# Patient Record
Sex: MALE | Race: OTHER | HISPANIC OR LATINO | URBAN - METROPOLITAN AREA
[De-identification: names, ages, dates, MRNs, and addresses within clinical notes are randomized per-mention and may not be internally consistent; named-entity substitution may affect disease eponyms.]

---

## 2017-08-05 ENCOUNTER — EMERGENCY (EMERGENCY)
Facility: HOSPITAL | Age: 42
LOS: 1 days | Discharge: HOME | End: 2017-08-05
Attending: EMERGENCY MEDICINE | Admitting: EMERGENCY MEDICINE
Payer: COMMERCIAL

## 2017-08-05 VITALS
TEMPERATURE: 99 F | SYSTOLIC BLOOD PRESSURE: 151 MMHG | RESPIRATION RATE: 16 BRPM | OXYGEN SATURATION: 97 % | DIASTOLIC BLOOD PRESSURE: 94 MMHG | WEIGHT: 199.96 LBS | HEIGHT: 65.75 IN | HEART RATE: 78 BPM

## 2017-08-05 PROBLEM — Z00.00 ENCOUNTER FOR PREVENTIVE HEALTH EXAMINATION: Status: ACTIVE | Noted: 2017-08-05

## 2017-08-05 PROCEDURE — T1013: CPT

## 2017-08-05 PROCEDURE — 99282 EMERGENCY DEPT VISIT SF MDM: CPT

## 2017-08-05 NOTE — ED STATDOCS - OBJECTIVE STATEMENT
42 y/o M w/ no significant PMHx presents to the ED here for a surgery consult. Pt has a mass on his R wrist x1 year. He has pain with the mass. Pt denies fever, CP, abd pain, SOB, HA or any other complaints at this time. Pt has no allergies and is on no medications.  : Angelita 40 y/o M w/ no significant PMHx presents to the ED here for a surgery consult. Pt has a mass on his R wrist x1 year. He has mild pain with the mass x 1 year. Pt denies fever, CP, abd pain, SOB, HA or any other complaints at this time. Pt has no allergies and is on no medications. denies numbness/tingling to fingers  : Angelita

## 2017-08-05 NOTE — ED ADULT TRIAGE NOTE - CHIEF COMPLAINT QUOTE
pt presents to ED with ED raised bump to right wrist x one year. pt states pain worsened x two weeks. afebrile.

## 2017-08-05 NOTE — ED STATDOCS - SKIN, MLM
3x3 cm cystic structure to radial aspect of the R hand w/ no erythema, no tenderness, and no increased warmth. 3x3 cm cystic structure to radial aspect of the R hand w/ no erythema, no tenderness, and no increased warmth. neurovascularly intact

## 2021-05-11 ENCOUNTER — INPATIENT (INPATIENT)
Facility: HOSPITAL | Age: 46
LOS: 2 days | Discharge: HOME | DRG: 176 | End: 2021-05-14
Attending: HOSPITALIST | Admitting: FAMILY MEDICINE
Payer: MEDICAID

## 2021-05-11 VITALS
OXYGEN SATURATION: 92 % | SYSTOLIC BLOOD PRESSURE: 155 MMHG | WEIGHT: 210.1 LBS | HEART RATE: 101 BPM | RESPIRATION RATE: 20 BRPM | DIASTOLIC BLOOD PRESSURE: 128 MMHG | HEIGHT: 65.75 IN | TEMPERATURE: 99 F

## 2021-05-11 DIAGNOSIS — I26.99 OTHER PULMONARY EMBOLISM WITHOUT ACUTE COR PULMONALE: ICD-10-CM

## 2021-05-11 DIAGNOSIS — R03.0 ELEVATED BLOOD-PRESSURE READING, WITHOUT DIAGNOSIS OF HYPERTENSION: ICD-10-CM

## 2021-05-11 DIAGNOSIS — E87.6 HYPOKALEMIA: ICD-10-CM

## 2021-05-11 DIAGNOSIS — R74.01 ELEVATION OF LEVELS OF LIVER TRANSAMINASE LEVELS: ICD-10-CM

## 2021-05-11 DIAGNOSIS — E66.9 OBESITY, UNSPECIFIED: ICD-10-CM

## 2021-05-11 LAB
ALBUMIN SERPL ELPH-MCNC: 4.2 G/DL — SIGNIFICANT CHANGE UP (ref 3.3–5.2)
ALP SERPL-CCNC: 78 U/L — SIGNIFICANT CHANGE UP (ref 40–120)
ALT FLD-CCNC: 114 U/L — HIGH
ANION GAP SERPL CALC-SCNC: 13 MMOL/L — SIGNIFICANT CHANGE UP (ref 5–17)
ANISOCYTOSIS BLD QL: SLIGHT — SIGNIFICANT CHANGE UP
APTT BLD: 149 SEC — CRITICAL HIGH (ref 27.5–35.5)
APTT BLD: 26.9 SEC — LOW (ref 27.5–35.5)
AST SERPL-CCNC: 99 U/L — HIGH
BASOPHILS # BLD AUTO: 0.2 K/UL — SIGNIFICANT CHANGE UP (ref 0–0.2)
BASOPHILS NFR BLD AUTO: 1.7 % — SIGNIFICANT CHANGE UP (ref 0–2)
BILIRUB SERPL-MCNC: 0.4 MG/DL — SIGNIFICANT CHANGE UP (ref 0.4–2)
BUN SERPL-MCNC: 14 MG/DL — SIGNIFICANT CHANGE UP (ref 8–20)
CALCIUM SERPL-MCNC: 9.9 MG/DL — SIGNIFICANT CHANGE UP (ref 8.6–10.2)
CHLORIDE SERPL-SCNC: 106 MMOL/L — SIGNIFICANT CHANGE UP (ref 98–107)
CO2 SERPL-SCNC: 24 MMOL/L — SIGNIFICANT CHANGE UP (ref 22–29)
CREAT SERPL-MCNC: 0.95 MG/DL — SIGNIFICANT CHANGE UP (ref 0.5–1.3)
CRP SERPL-MCNC: 46 MG/L — HIGH
D DIMER BLD IA.RAPID-MCNC: 1886 NG/ML DDU — HIGH
EOSINOPHIL # BLD AUTO: 0 K/UL — SIGNIFICANT CHANGE UP (ref 0–0.5)
EOSINOPHIL NFR BLD AUTO: 0 % — SIGNIFICANT CHANGE UP (ref 0–6)
FERRITIN SERPL-MCNC: 24 NG/ML — LOW (ref 30–400)
GIANT PLATELETS BLD QL SMEAR: PRESENT — SIGNIFICANT CHANGE UP
GLUCOSE SERPL-MCNC: 88 MG/DL — SIGNIFICANT CHANGE UP (ref 70–99)
HCT VFR BLD CALC: 39.3 % — SIGNIFICANT CHANGE UP (ref 39–50)
HCT VFR BLD CALC: 40.3 % — SIGNIFICANT CHANGE UP (ref 39–50)
HGB BLD-MCNC: 12.1 G/DL — LOW (ref 13–17)
HGB BLD-MCNC: 12.5 G/DL — LOW (ref 13–17)
INR BLD: 1.17 RATIO — HIGH (ref 0.88–1.16)
LYMPHOCYTES # BLD AUTO: 17.2 % — SIGNIFICANT CHANGE UP (ref 13–44)
LYMPHOCYTES # BLD AUTO: 2.01 K/UL — SIGNIFICANT CHANGE UP (ref 1–3.3)
MANUAL SMEAR VERIFICATION: SIGNIFICANT CHANGE UP
MCHC RBC-ENTMCNC: 21.4 PG — LOW (ref 27–34)
MCHC RBC-ENTMCNC: 21.4 PG — LOW (ref 27–34)
MCHC RBC-ENTMCNC: 30.8 GM/DL — LOW (ref 32–36)
MCHC RBC-ENTMCNC: 31 GM/DL — LOW (ref 32–36)
MCV RBC AUTO: 68.9 FL — LOW (ref 80–100)
MCV RBC AUTO: 69.4 FL — LOW (ref 80–100)
MICROCYTES BLD QL: SLIGHT — SIGNIFICANT CHANGE UP
MONOCYTES # BLD AUTO: 0.41 K/UL — SIGNIFICANT CHANGE UP (ref 0–0.9)
MONOCYTES NFR BLD AUTO: 3.5 % — SIGNIFICANT CHANGE UP (ref 2–14)
MYELOCYTES NFR BLD: 0.9 % — HIGH (ref 0–0)
NEUTROPHILS # BLD AUTO: 8.64 K/UL — HIGH (ref 1.8–7.4)
NEUTROPHILS NFR BLD AUTO: 74.1 % — SIGNIFICANT CHANGE UP (ref 43–77)
NRBC # BLD: 1 /100 — HIGH (ref 0–0)
PLAT MORPH BLD: NORMAL — SIGNIFICANT CHANGE UP
PLATELET # BLD AUTO: 195 K/UL — SIGNIFICANT CHANGE UP (ref 150–400)
PLATELET # BLD AUTO: 196 K/UL — SIGNIFICANT CHANGE UP (ref 150–400)
POLYCHROMASIA BLD QL SMEAR: SIGNIFICANT CHANGE UP
POTASSIUM SERPL-MCNC: 3.2 MMOL/L — LOW (ref 3.5–5.3)
POTASSIUM SERPL-SCNC: 3.2 MMOL/L — LOW (ref 3.5–5.3)
PROCALCITONIN SERPL-MCNC: 0.09 NG/ML — SIGNIFICANT CHANGE UP (ref 0.02–0.1)
PROT SERPL-MCNC: 7.5 G/DL — SIGNIFICANT CHANGE UP (ref 6.6–8.7)
PROTHROM AB SERPL-ACNC: 13.5 SEC — SIGNIFICANT CHANGE UP (ref 10.6–13.6)
RBC # BLD: 5.66 M/UL — SIGNIFICANT CHANGE UP (ref 4.2–5.8)
RBC # BLD: 5.85 M/UL — HIGH (ref 4.2–5.8)
RBC # FLD: 17.9 % — HIGH (ref 10.3–14.5)
RBC # FLD: 18 % — HIGH (ref 10.3–14.5)
RBC BLD AUTO: ABNORMAL
SARS-COV-2 RNA SPEC QL NAA+PROBE: SIGNIFICANT CHANGE UP
SODIUM SERPL-SCNC: 143 MMOL/L — SIGNIFICANT CHANGE UP (ref 135–145)
TROPONIN T SERPL-MCNC: 0.1 NG/ML — HIGH (ref 0–0.06)
VARIANT LYMPHS # BLD: 2.6 % — SIGNIFICANT CHANGE UP (ref 0–6)
WBC # BLD: 10.82 K/UL — HIGH (ref 3.8–10.5)
WBC # BLD: 11.66 K/UL — HIGH (ref 3.8–10.5)
WBC # FLD AUTO: 10.82 K/UL — HIGH (ref 3.8–10.5)
WBC # FLD AUTO: 11.66 K/UL — HIGH (ref 3.8–10.5)

## 2021-05-11 PROCEDURE — 93306 TTE W/DOPPLER COMPLETE: CPT | Mod: 26

## 2021-05-11 PROCEDURE — 93971 EXTREMITY STUDY: CPT | Mod: 26,RT

## 2021-05-11 PROCEDURE — G1004: CPT

## 2021-05-11 PROCEDURE — 71275 CT ANGIOGRAPHY CHEST: CPT | Mod: 26,ME

## 2021-05-11 PROCEDURE — 99223 1ST HOSP IP/OBS HIGH 75: CPT

## 2021-05-11 PROCEDURE — 99291 CRITICAL CARE FIRST HOUR: CPT

## 2021-05-11 PROCEDURE — 93010 ELECTROCARDIOGRAM REPORT: CPT

## 2021-05-11 PROCEDURE — 71045 X-RAY EXAM CHEST 1 VIEW: CPT | Mod: 26

## 2021-05-11 RX ORDER — HEPARIN SODIUM 5000 [USP'U]/ML
9000 INJECTION INTRAVENOUS; SUBCUTANEOUS ONCE
Refills: 0 | Status: COMPLETED | OUTPATIENT
Start: 2021-05-11 | End: 2021-05-11

## 2021-05-11 RX ORDER — HEPARIN SODIUM 5000 [USP'U]/ML
INJECTION INTRAVENOUS; SUBCUTANEOUS
Qty: 25000 | Refills: 0 | Status: DISCONTINUED | OUTPATIENT
Start: 2021-05-11 | End: 2021-05-12

## 2021-05-11 RX ORDER — AMLODIPINE BESYLATE 2.5 MG/1
5 TABLET ORAL DAILY
Refills: 0 | Status: DISCONTINUED | OUTPATIENT
Start: 2021-05-11 | End: 2021-05-14

## 2021-05-11 RX ORDER — HEPARIN SODIUM 5000 [USP'U]/ML
4500 INJECTION INTRAVENOUS; SUBCUTANEOUS EVERY 6 HOURS
Refills: 0 | Status: DISCONTINUED | OUTPATIENT
Start: 2021-05-11 | End: 2021-05-12

## 2021-05-11 RX ORDER — POTASSIUM CHLORIDE 20 MEQ
40 PACKET (EA) ORAL ONCE
Refills: 0 | Status: COMPLETED | OUTPATIENT
Start: 2021-05-11 | End: 2021-05-11

## 2021-05-11 RX ORDER — ALBUTEROL 90 UG/1
2.5 AEROSOL, METERED ORAL EVERY 6 HOURS
Refills: 0 | Status: DISCONTINUED | OUTPATIENT
Start: 2021-05-11 | End: 2021-05-14

## 2021-05-11 RX ORDER — ALBUTEROL 90 UG/1
2 AEROSOL, METERED ORAL ONCE
Refills: 0 | Status: COMPLETED | OUTPATIENT
Start: 2021-05-11 | End: 2021-05-11

## 2021-05-11 RX ORDER — HEPARIN SODIUM 5000 [USP'U]/ML
9000 INJECTION INTRAVENOUS; SUBCUTANEOUS EVERY 6 HOURS
Refills: 0 | Status: DISCONTINUED | OUTPATIENT
Start: 2021-05-11 | End: 2021-05-12

## 2021-05-11 RX ADMIN — HEPARIN SODIUM 9000 UNIT(S): 5000 INJECTION INTRAVENOUS; SUBCUTANEOUS at 17:50

## 2021-05-11 RX ADMIN — ALBUTEROL 2 PUFF(S): 90 AEROSOL, METERED ORAL at 12:28

## 2021-05-11 RX ADMIN — Medication 40 MILLIEQUIVALENT(S): at 14:20

## 2021-05-11 RX ADMIN — AMLODIPINE BESYLATE 5 MILLIGRAM(S): 2.5 TABLET ORAL at 21:57

## 2021-05-11 RX ADMIN — HEPARIN SODIUM 2000 UNIT(S)/HR: 5000 INJECTION INTRAVENOUS; SUBCUTANEOUS at 17:50

## 2021-05-11 NOTE — H&P ADULT - PROBLEM SELECTOR PLAN 4
pt. has reported no h/o htn but bp noted to be running high in the ER, will keep on norvasc 5 mg daily with parameters and monitor bp closely.

## 2021-05-11 NOTE — ED PROVIDER NOTE - OBJECTIVE STATEMENT
44 y/o M with no known PMHx p/w 3 days of DIXON and SOB worse with walking.  Pt denies f/c/r but c/o dry cough and some cramping abdominal pain while walking.  PT denies n/v/d.  PT denies recent trauma.  He denies recent travel or known sick contacts.  PT hasn't had Covid in the past and hasn't had a Covid vaccination.  He currently works at a [employer].

## 2021-05-11 NOTE — H&P ADULT - PROBLEM SELECTOR PLAN 3
likely related to his obesity ast 99, alt 114, but will send hepatitis panel to be followed, will trend.

## 2021-05-11 NOTE — H&P ADULT - PROBLEM SELECTOR PLAN 1
pt's ct angio with Bilateral multifocal lobar and segmental pulmonary emboli with associated right ventricular strain.  clinically pt. stable and not in acute respiratory distress, ICu consult appreciated, pt. will be on heparin drip. o2 support  and albuterol neb as needed. echo to follow, trop to trend 1st 0.10, bnp 1704. case discussed with cardiologist Dr. Powell.   Pt's PE appears unprovoked, pt. will benefit from hematology follow up as an out-pt for genetic testing for hypercoagulability.

## 2021-05-11 NOTE — CONSULT NOTE ADULT - SUBJECTIVE AND OBJECTIVE BOX
Patient is a 45y old  Male who presents with a chief complaint of     BRIEF HOSPITAL COURSE:   45M w/ no significant PMHx admitted w/ exertional dyspnea for the past few days. Denies any CP, palpitations, LOC, fever, cough, N/V/D. ICU consulted for PE seen on CTA. No h/o recent travel, LE pain/swelling and no significant FHx. No COVID contacts. On ICU eval he was hemodynamically stable w/ no increased WOB and was saturating in low 90s on RA    PAST MEDICAL & SURGICAL HISTORY:  No pertinent past medical history    No significant past surgical history    Review of Systems:  CONSTITUTIONAL: No fever, chills, or fatigue  EYES: No eye pain, visual disturbances, or discharge  ENMT:  No difficulty hearing, tinnitus, vertigo; No sinus or throat pain  NECK: No pain or stiffness  RESPIRATORY: No cough, wheezing, chills or hemoptysis; No shortness of breath  CARDIOVASCULAR: No chest pain, palpitations, dizziness, or leg swelling  GASTROINTESTINAL: No abdominal or epigastric pain. No nausea, vomiting, or hematemesis; No diarrhea or constipation. No melena or hematochezia.  GENITOURINARY: No dysuria, frequency, hematuria, or incontinence  NEUROLOGICAL: No headaches, memory loss, loss of strength, numbness, or tremors  SKIN: No itching, burning, rashes, or lesions   MUSCULOSKELETAL: No joint pain or swelling; No muscle, back, or extremity pain  PSYCHIATRIC: No depression, anxiety, mood swings, or difficulty sleeping    Physical Examination:    ICU Vital Signs Last 24 Hrs  T(C): 37.1 (11 May 2021 11:04), Max: 37.1 (11 May 2021 11:04)  T(F): 98.7 (11 May 2021 11:04), Max: 98.7 (11 May 2021 11:04)  HR: 101 (11 May 2021 11:04) (101 - 101)  BP: 155/128 (11 May 2021 11:04) (155/128 - 155/128)  BP(mean): --  ABP: --  ABP(mean): --  RR: 20 (11 May 2021 11:04) (20 - 20)  SpO2: 92% (11 May 2021 11:04) (92% - 92%)    General: No acute distress  Neuro: AAO*3, No motor, sensory, or cranial nerve deficit  HEENT: Pupils equal, reactive to light, Moist oral mucosa  PULM: Clear to auscultation bilaterally  CVS: Regular rhythm and controlled rate  ABD: Soft, nondistended, nontender, normoactive bowel sounds  EXT: No b/l LE edema, nontender  SKIN: Warm and well perfused, no acute rashes       Medications:  heparin   Injectable 9000 Unit(s) IV Push every 6 hours PRN  heparin   Injectable 4500 Unit(s) IV Push every 6 hours PRN  heparin  Infusion.  Unit(s)/Hr IV Continuous <Continuous>    I&O's Detail        RADIOLOGY/ Microbiology/ Labs: reviewed

## 2021-05-11 NOTE — ED PROVIDER NOTE - CARE PLAN
Principal Discharge DX:	Acute pulmonary embolism, unspecified pulmonary embolism type, unspecified whether acute cor pulmonale present

## 2021-05-11 NOTE — ED ADULT TRIAGE NOTE - CHIEF COMPLAINT QUOTE
patient c/o SOB since yesterday when exerting oneself. stomach accompanies the symptoms. denies medical problems but does not see any doctors.

## 2021-05-11 NOTE — ED PROVIDER NOTE - PROGRESS NOTE DETAILS
AJM: pt received in sign out. VS improved. CTA shows extensive PE with right heart strain + cardiac biomarkers. already on heparin. ICU consulted for possible micu and tpa vs floor. pending ICU eval. Stable at this time. AJM: pt seen by cards and stable for cdu. decline tpa reccs. spoke to dr blank for admission who requests echo and cards consult. orders placed.

## 2021-05-11 NOTE — ED PROVIDER NOTE - CLINICAL SUMMARY MEDICAL DECISION MAKING FREE TEXT BOX
Pt p/w DIXON, SOB, hypoxia and Right leg pain/swelling.  DDx includes DVT/PE, pneumonia, Covid 19. Plan: Cardiac workup, RLE doppler, Covid swab, CTA chest

## 2021-05-11 NOTE — H&P ADULT - NSHPPHYSICALEXAM_GEN_ALL_CORE
General: Obese  male lying in bed not in distress.  HEENT: AT, NC. PERRL. intact EOM. no throat erythema or exudate.   Neck: supple. no JVD.   Chest: air entry is fair bilaterally. no inter costal retractions.   Heart: S1,S2. RRR. no heart murmur. no edema of upper / lower ext.   Abdomen: soft. non-tender. obese, + BS.   Ext: no calf tenderness on either side, ROM of all ext. intact, distal pulses intact.   Neuro: AAO x3. no focal weakness. no speech disorder, cns ii to xii intact. m /r/s intact.   Skin: no obvious rash noted, no pallor, no diaphoresis.   Psych : normal affect , no si/hi. General: Obese  male lying in bed not in distress.  HEENT: AT, NC. PERRL. intact EOM. no throat erythema or exudate.   Neck: supple. no JVD.   Chest: air entry is fair bilaterally. no inter costal retractions. talking in full sentences.   Heart: S1,S2. RRR. no heart murmur. no edema of upper / lower ext.   Abdomen: soft. non-tender. obese, + BS.   Ext: no calf tenderness on either side, ROM of all ext. intact, distal pulses intact.   Neuro: AAO x3. no focal weakness. no speech disorder, cns ii to xii intact. m /r/s intact.   Skin: no obvious rash noted, no pallor, no diaphoresis.   Psych : normal affect , no si/hi.

## 2021-05-11 NOTE — CONSULT NOTE ADULT - ASSESSMENT
45M w/ no significant PMHx admitted w/ exertional dyspnea     Intermediate high risk PE     Segmental PEs on CTA w/ RV strain and elevated biomarkers  Continue hep gtt. Check TTE and trend enzymes  Will activate PERT team and also needs a routine Heam consult  No ICU needs at this time   Case d/w ED team  45M w/ no significant PMHx admitted w/ exertional dyspnea     Intermediate high risk PE     Segmental PEs on CTA w/ RV strain and elevated biomarkers  SPESI score: 0, PHUC: stage 2 w/ 18% PE related complications  Continue hep gtt. Check TTE and trend enzymes  Will activate PERT team and also needs a routine Heam consult  No ICU needs at this time   Case d/w ED team

## 2021-05-11 NOTE — H&P ADULT - ASSESSMENT
pt. is a 44 y/o obese male with no significant PMHx , on no medicines came to the ER for exertional dyspnea for the past few days. pt. works in a bakery. Denies any CP, palpitations, LOC, fever, cough, N/V/D. no sick contact,  no recent travel. covid-19 negative in the ER. no abd. pain, no black stool or hematemesis/ hemoptysis reported. pt. has reported some vague pain in RLE x 1 day but no pain at the time of admission. 02 sat 92-94 % RA. RLE doppler negative for dvt. pt's ct angio chest :  Bilateral multifocal lobar and segmental pulmonary emboli with associated right ventricular strain.

## 2021-05-11 NOTE — ED ADULT NURSE NOTE - NSIMPLEMENTINTERV_GEN_ALL_ED
Implemented All Universal Safety Interventions:  [place] to call system. Call bell, personal items and telephone within reach. Instruct patient to call for assistance. Room bathroom lighting operational. Non-slip footwear when patient is off stretcher. Physically safe environment: no spills, clutter or unnecessary equipment. Stretcher in lowest position, wheels locked, appropriate side rails in place.

## 2021-05-11 NOTE — H&P ADULT - HISTORY OF PRESENT ILLNESS
pt. is a 46 y/o obese male with no significant PMHx came to the ER for exertional dyspnea for the past few days. pt. works in a bakery. Denies any CP, palpitations, LOC, fever, cough, N/V/D. no sick contact,  no recent travel. covid-19 negative in the ER. no abd. pain, no black stool or hematemesis/ hemoptysis reported. pt. has reported some vague pain in RLE x 1 day but no pain at the time of admission. 02 sat 92-94 % RA. RLE doppler negative for dvt. pt's ct angio chest :  Bilateral multifocal lobar and segmental pulmonary emboli with associated right ventricular strain. pt. is a 44 y/o obese male with no significant PMHx , on no medicines came to the ER for exertional dyspnea for the past few days. pt. works in a bakery. Denies any CP, palpitations, LOC, fever, cough, N/V/D. no sick contact,  no recent travel. covid-19 negative in the ER. no abd. pain, no black stool or hematemesis/ hemoptysis reported. pt. has reported some vague pain in RLE x 1 day but no pain at the time of admission. 02 sat 92-94 % RA. RLE doppler negative for dvt. pt's ct angio chest :  Bilateral multifocal lobar and segmental pulmonary emboli with associated right ventricular strain.  pt. has been started on heparin drip in the ER. Echo is requested.

## 2021-05-12 DIAGNOSIS — I26.09 OTHER PULMONARY EMBOLISM WITH ACUTE COR PULMONALE: ICD-10-CM

## 2021-05-12 DIAGNOSIS — I26.99 OTHER PULMONARY EMBOLISM WITHOUT ACUTE COR PULMONALE: ICD-10-CM

## 2021-05-12 LAB
ALBUMIN SERPL ELPH-MCNC: 4.1 G/DL — SIGNIFICANT CHANGE UP (ref 3.3–5.2)
ALP SERPL-CCNC: 76 U/L — SIGNIFICANT CHANGE UP (ref 40–120)
ALT FLD-CCNC: 96 U/L — HIGH
ANION GAP SERPL CALC-SCNC: 13 MMOL/L — SIGNIFICANT CHANGE UP (ref 5–17)
APTT BLD: 56.5 SEC — HIGH (ref 27.5–35.5)
APTT BLD: 70.7 SEC — HIGH (ref 27.5–35.5)
APTT BLD: 90.2 SEC — HIGH (ref 27.5–35.5)
AST SERPL-CCNC: 56 U/L — HIGH
BASOPHILS # BLD AUTO: 0.07 K/UL — SIGNIFICANT CHANGE UP (ref 0–0.2)
BASOPHILS NFR BLD AUTO: 0.6 % — SIGNIFICANT CHANGE UP (ref 0–2)
BILIRUB SERPL-MCNC: 0.6 MG/DL — SIGNIFICANT CHANGE UP (ref 0.4–2)
BUN SERPL-MCNC: 11 MG/DL — SIGNIFICANT CHANGE UP (ref 8–20)
CALCIUM SERPL-MCNC: 9.7 MG/DL — SIGNIFICANT CHANGE UP (ref 8.6–10.2)
CHLORIDE SERPL-SCNC: 105 MMOL/L — SIGNIFICANT CHANGE UP (ref 98–107)
CO2 SERPL-SCNC: 25 MMOL/L — SIGNIFICANT CHANGE UP (ref 22–29)
COVID-19 SPIKE DOMAIN AB INTERP: POSITIVE
COVID-19 SPIKE DOMAIN ANTIBODY RESULT: >250 U/ML — HIGH
CREAT SERPL-MCNC: 0.9 MG/DL — SIGNIFICANT CHANGE UP (ref 0.5–1.3)
EOSINOPHIL # BLD AUTO: 0.25 K/UL — SIGNIFICANT CHANGE UP (ref 0–0.5)
EOSINOPHIL NFR BLD AUTO: 2 % — SIGNIFICANT CHANGE UP (ref 0–6)
FIBRINOGEN PPP-MCNC: 570 MG/DL — HIGH (ref 290–520)
GLUCOSE SERPL-MCNC: 102 MG/DL — HIGH (ref 70–99)
HAV IGM SER-ACNC: SIGNIFICANT CHANGE UP
HBV CORE IGM SER-ACNC: SIGNIFICANT CHANGE UP
HBV SURFACE AG SER-ACNC: SIGNIFICANT CHANGE UP
HCT VFR BLD CALC: 39.3 % — SIGNIFICANT CHANGE UP (ref 39–50)
HCT VFR BLD CALC: 40.6 % — SIGNIFICANT CHANGE UP (ref 39–50)
HCT VFR BLD CALC: 42.2 % — SIGNIFICANT CHANGE UP (ref 39–50)
HCV AB S/CO SERPL IA: 0.21 S/CO — SIGNIFICANT CHANGE UP (ref 0–0.99)
HCV AB SERPL-IMP: SIGNIFICANT CHANGE UP
HGB BLD-MCNC: 12.3 G/DL — LOW (ref 13–17)
HGB BLD-MCNC: 12.5 G/DL — LOW (ref 13–17)
HGB BLD-MCNC: 13 G/DL — SIGNIFICANT CHANGE UP (ref 13–17)
IMM GRANULOCYTES NFR BLD AUTO: 0.6 % — SIGNIFICANT CHANGE UP (ref 0–1.5)
LYMPHOCYTES # BLD AUTO: 2.95 K/UL — SIGNIFICANT CHANGE UP (ref 1–3.3)
LYMPHOCYTES # BLD AUTO: 24 % — SIGNIFICANT CHANGE UP (ref 13–44)
MCHC RBC-ENTMCNC: 21.4 PG — LOW (ref 27–34)
MCHC RBC-ENTMCNC: 21.5 PG — LOW (ref 27–34)
MCHC RBC-ENTMCNC: 21.6 PG — LOW (ref 27–34)
MCHC RBC-ENTMCNC: 30.8 GM/DL — LOW (ref 32–36)
MCHC RBC-ENTMCNC: 30.8 GM/DL — LOW (ref 32–36)
MCHC RBC-ENTMCNC: 31.3 GM/DL — LOW (ref 32–36)
MCV RBC AUTO: 68.6 FL — LOW (ref 80–100)
MCV RBC AUTO: 69.5 FL — LOW (ref 80–100)
MCV RBC AUTO: 70 FL — LOW (ref 80–100)
MONOCYTES # BLD AUTO: 1.05 K/UL — HIGH (ref 0–0.9)
MONOCYTES NFR BLD AUTO: 8.5 % — SIGNIFICANT CHANGE UP (ref 2–14)
NEUTROPHILS # BLD AUTO: 7.91 K/UL — HIGH (ref 1.8–7.4)
NEUTROPHILS NFR BLD AUTO: 64.3 % — SIGNIFICANT CHANGE UP (ref 43–77)
PLATELET # BLD AUTO: 202 K/UL — SIGNIFICANT CHANGE UP (ref 150–400)
PLATELET # BLD AUTO: 206 K/UL — SIGNIFICANT CHANGE UP (ref 150–400)
PLATELET # BLD AUTO: 216 K/UL — SIGNIFICANT CHANGE UP (ref 150–400)
POTASSIUM SERPL-MCNC: 3.4 MMOL/L — LOW (ref 3.5–5.3)
POTASSIUM SERPL-SCNC: 3.4 MMOL/L — LOW (ref 3.5–5.3)
PROT SERPL-MCNC: 8 G/DL — SIGNIFICANT CHANGE UP (ref 6.6–8.7)
RBC # BLD: 5.73 M/UL — SIGNIFICANT CHANGE UP (ref 4.2–5.8)
RBC # BLD: 5.84 M/UL — HIGH (ref 4.2–5.8)
RBC # BLD: 6.03 M/UL — HIGH (ref 4.2–5.8)
RBC # FLD: 18.5 % — HIGH (ref 10.3–14.5)
RBC # FLD: 18.5 % — HIGH (ref 10.3–14.5)
RBC # FLD: 18.7 % — HIGH (ref 10.3–14.5)
SARS-COV-2 IGG+IGM SERPL QL IA: >250 U/ML — HIGH
SARS-COV-2 IGG+IGM SERPL QL IA: POSITIVE
SODIUM SERPL-SCNC: 143 MMOL/L — SIGNIFICANT CHANGE UP (ref 135–145)
TROPONIN T SERPL-MCNC: 0.05 NG/ML — SIGNIFICANT CHANGE UP (ref 0–0.06)
TROPONIN T SERPL-MCNC: 0.06 NG/ML — SIGNIFICANT CHANGE UP (ref 0–0.06)
WBC # BLD: 12.31 K/UL — HIGH (ref 3.8–10.5)
WBC # BLD: 9.73 K/UL — SIGNIFICANT CHANGE UP (ref 3.8–10.5)
WBC # BLD: 9.93 K/UL — SIGNIFICANT CHANGE UP (ref 3.8–10.5)
WBC # FLD AUTO: 12.31 K/UL — HIGH (ref 3.8–10.5)
WBC # FLD AUTO: 9.73 K/UL — SIGNIFICANT CHANGE UP (ref 3.8–10.5)
WBC # FLD AUTO: 9.93 K/UL — SIGNIFICANT CHANGE UP (ref 3.8–10.5)

## 2021-05-12 PROCEDURE — 99233 SBSQ HOSP IP/OBS HIGH 50: CPT

## 2021-05-12 PROCEDURE — 99152 MOD SED SAME PHYS/QHP 5/>YRS: CPT | Mod: 59

## 2021-05-12 PROCEDURE — 75743 ARTERY X-RAYS LUNGS: CPT | Mod: 26,59

## 2021-05-12 PROCEDURE — 37211 THROMBOLYTIC ART THERAPY: CPT | Mod: 50,59

## 2021-05-12 PROCEDURE — 36014 PLACE CATHETER IN ARTERY: CPT | Mod: 59

## 2021-05-12 PROCEDURE — 99223 1ST HOSP IP/OBS HIGH 75: CPT

## 2021-05-12 RX ORDER — HEPARIN SODIUM 5000 [USP'U]/ML
9000 INJECTION INTRAVENOUS; SUBCUTANEOUS EVERY 6 HOURS
Refills: 0 | Status: DISCONTINUED | OUTPATIENT
Start: 2021-05-12 | End: 2021-05-12

## 2021-05-12 RX ORDER — HEPARIN SODIUM 5000 [USP'U]/ML
1600 INJECTION INTRAVENOUS; SUBCUTANEOUS
Qty: 25000 | Refills: 0 | Status: DISCONTINUED | OUTPATIENT
Start: 2021-05-12 | End: 2021-05-12

## 2021-05-12 RX ORDER — HEPARIN SODIUM 5000 [USP'U]/ML
400 INJECTION INTRAVENOUS; SUBCUTANEOUS
Qty: 25000 | Refills: 0 | Status: DISCONTINUED | OUTPATIENT
Start: 2021-05-12 | End: 2021-05-13

## 2021-05-12 RX ORDER — ALTEPLASE 100 MG
1 KIT INTRAVENOUS
Qty: 10 | Refills: 0 | Status: DISCONTINUED | OUTPATIENT
Start: 2021-05-12 | End: 2021-05-13

## 2021-05-12 RX ORDER — LOSARTAN POTASSIUM 100 MG/1
50 TABLET, FILM COATED ORAL DAILY
Refills: 0 | Status: DISCONTINUED | OUTPATIENT
Start: 2021-05-12 | End: 2021-05-12

## 2021-05-12 RX ORDER — HEPARIN SODIUM 5000 [USP'U]/ML
500 INJECTION INTRAVENOUS; SUBCUTANEOUS
Qty: 25000 | Refills: 0 | Status: DISCONTINUED | OUTPATIENT
Start: 2021-05-12 | End: 2021-05-12

## 2021-05-12 RX ORDER — POTASSIUM CHLORIDE 20 MEQ
10 PACKET (EA) ORAL
Refills: 0 | Status: COMPLETED | OUTPATIENT
Start: 2021-05-12 | End: 2021-05-12

## 2021-05-12 RX ORDER — HEPARIN SODIUM 5000 [USP'U]/ML
4500 INJECTION INTRAVENOUS; SUBCUTANEOUS EVERY 6 HOURS
Refills: 0 | Status: DISCONTINUED | OUTPATIENT
Start: 2021-05-12 | End: 2021-05-12

## 2021-05-12 RX ORDER — HEPARIN SODIUM 5000 [USP'U]/ML
500 INJECTION INTRAVENOUS; SUBCUTANEOUS
Qty: 25000 | Refills: 0 | Status: DISCONTINUED | OUTPATIENT
Start: 2021-05-12 | End: 2021-05-13

## 2021-05-12 RX ORDER — SODIUM CHLORIDE 9 MG/ML
1000 INJECTION INTRAMUSCULAR; INTRAVENOUS; SUBCUTANEOUS
Refills: 0 | Status: DISCONTINUED | OUTPATIENT
Start: 2021-05-12 | End: 2021-05-13

## 2021-05-12 RX ORDER — LOSARTAN POTASSIUM 100 MG/1
100 TABLET, FILM COATED ORAL DAILY
Refills: 0 | Status: DISCONTINUED | OUTPATIENT
Start: 2021-05-12 | End: 2021-05-14

## 2021-05-12 RX ADMIN — Medication 100 MILLIEQUIVALENT(S): at 13:00

## 2021-05-12 RX ADMIN — HEPARIN SODIUM 1600 UNIT(S)/HR: 5000 INJECTION INTRAVENOUS; SUBCUTANEOUS at 08:11

## 2021-05-12 RX ADMIN — HEPARIN SODIUM 0 UNIT(S)/HR: 5000 INJECTION INTRAVENOUS; SUBCUTANEOUS at 00:02

## 2021-05-12 RX ADMIN — Medication 100 MILLIEQUIVALENT(S): at 14:00

## 2021-05-12 RX ADMIN — HEPARIN SODIUM 1600 UNIT(S)/HR: 5000 INJECTION INTRAVENOUS; SUBCUTANEOUS at 01:19

## 2021-05-12 RX ADMIN — Medication 100 MILLIEQUIVALENT(S): at 17:52

## 2021-05-12 RX ADMIN — LOSARTAN POTASSIUM 100 MILLIGRAM(S): 100 TABLET, FILM COATED ORAL at 10:14

## 2021-05-12 RX ADMIN — HEPARIN SODIUM 4 UNIT(S)/HR: 5000 INJECTION INTRAVENOUS; SUBCUTANEOUS at 22:30

## 2021-05-12 RX ADMIN — AMLODIPINE BESYLATE 5 MILLIGRAM(S): 2.5 TABLET ORAL at 06:21

## 2021-05-12 NOTE — CONSULT NOTE ADULT - PROBLEM SELECTOR RECOMMENDATION 9
Unprovoked PE  Telemonitor  O2 nasal cannula 2 lt PRN  Maintain NPO x now  Continuye Heparine Unprovoked Bilateral pulmonary embolism.  Telemonitor  O2 nasal cannula 2 lt PRN  Maintain NPO x now  Continuye Heparine  Echo and stress test pending   Trend troponin and serial EKG  PESI score  55 points. Class I, Very Low Risk: 0-1.6% 30-day mortality in this group. Unprovoked Bilateral pulmonary embolism.  Telemonitor  O2 nasal cannula 2 lt PRN  Maintain NPO x now  Continuye Heparine  Echo pending   Trend troponin and serial EKG  PESI score  55 points. Class I, Very Low Risk: 0-1.6% 30-day mortality in this group.

## 2021-05-12 NOTE — PROGRESS NOTE ADULT - ASSESSMENT
1- Bilateral segmental and subsegmental pulmonary embolisms with cor pulmonale  2- s/p EKOS with bilateral femoral catheters infusing TPA, heparin and saline  3- HTN  4- hypokalemia  5- Transaminititis    ICU ASSESSMENT AND PLAN:  1- Pulmonary Embolisms  - Continue TPA, which will finish infusion at 4:30am tomorrow morning  - Continue Heparin infusion, while TPA infusing, goal PTT is 40-60, after TPA completed and catheter has been removed, increase PTT goal to 60-90  - Patient to remain flat, max head of bed to 30', patient understands risks  - Repeat Echo tomorrow evening  - Neurovascular checks q1h until patient can ambulate    2- EKOS, plan as above    3- HTN  - Started Norvasc, Losartan  - May consider hydralazine in shorterm overnight for BP control while patient is bedbound    4- Potassium repleted  - Repeat labs in AM  - Keep K >4.0, Mg >2.0, Phos >3.0    5- trend LFTs

## 2021-05-12 NOTE — PROGRESS NOTE ADULT - SUBJECTIVE AND OBJECTIVE BOX
Department of Cardiology                                         [facility]/[place] [address]                                 Telephone: [phone]. Fax:[phone]                                         Pre Procedure Cardiac Cath NP Note      Narrative:      44 y/o obese male with no significant PMHx , on no medicines came to the ER for exertional dyspnea for the past few days. pt. works in a bakery.           ASA:  2  Mallampati:  2   Bleeding Risk:     Creatinine:   0.90  GFR:  103   	  MEDICATIONS:  amLODIPine   Tablet 5 milliGRAM(s) Oral daily  losartan 100 milliGRAM(s) Oral daily  ALBUTerol    0.083% 2.5 milliGRAM(s) Nebulizer every 6 hours PRN  alteplase    Infusion 1 mG/Hr IntraCatheter.. <Continuous>  alteplase    Infusion 1 mG/Hr IntraCatheter.. <Continuous>  heparin   Injectable 9000 Unit(s) IV Push every 6 hours PRN  heparin   Injectable 4500 Unit(s) IV Push every 6 hours PRN  heparin  Infusion. 1600 Unit(s)/Hr IV Continuous <Continuous>  potassium chloride  10 mEq/100 mL IVPB 10 milliEquivalent(s) IV Intermittent every 1 hour                   12.5   9.93  )-----------( 206      ( 12 May 2021 07:31 )             40.6         143  |  105  |  11.0  ----------------------------<  102<H>  3.4<L>   |  25.0  |  0.90    Ca    9.7      12 May 2021 06:35    TPro  8.0  /  Alb  4.1  /  TBili  0.6  /  DBili  x   /  AST  56<H>  /  ALT  96<H>  /  AlkPhos  76        PHYSICAL EXAM:    T(C): 36.9 (21 @ 11:45), Max: 36.9 (21 @ 21:36)  HR: 94 (21 @ 14:00) (89 - 107)  BP: 132/92 (21 @ 14:00) (109/98 - 157/105)  RR: 16 (21 @ 14:00) (13 - 25)  SpO2: 95% (21 @ 14:00) (90% - 97%)      I&O's Summary    12 May 2021 07:01  -  12 May 2021 15:38  --------------------------------------------------------  IN: 248 mL / OUT: 300 mL / NET: -52 mL        Daily Weight in k.2 (12 May 2021 11:45)    Constitutional: A & O x 3  HEENT:   Normal oral mucosa, PERRL, EOMI	  Cardiovascular: Normal S1 S2, No JVD, No murmurs, No edema  Respiratory: Lungs clear to auscultation	  Gastrointestinal:  Soft, Non-tender, + BS	  Skin: No rashes, No ecchymoses, No cyanosis  Neurologic: Non-focal  Extremities: Normal range of motion, No clubbing, cyanosis or edema  Vascular: Peripheral pulses palpable 2+ bilaterally    TELEMETRY: 	    ECG:  	  < from: 12 Lead ECG (21 @ 11:11) >  Sinus tachycardia  Otherwise normal ECG    Confirmed by ROBERT MAX ([id number]) on 2021 11:28:57 PM    < end of copied text >    RADIOLOGY:   DIAGNOSTIC TESTING:  [ X] Echocardiogram:  < from: TTE Echo Complete w/o Contrast w/ Doppler (21 @ 20:01) >  Summary:   1. Technically adequate study.   2. Normal global left ventricular systolic function.   3. Left ventricular ejection fraction, by visual estimation, is 65 to 70%.   4. The left ventricular diastolic function could not be assessed in this study.   5. There is moderate concentric left ventricular hypertrophy.   6. Moderately enlarged right ventricle.   7. Severely reduced RV systolic function. Dow sign (RV apical hyperkinesis with RV free wall akinesis) is seen. Finding suggests acute PE.   8. Mildly enlarged right atrium.   9. Normal left atrial size.  10. Trace mitral valve regurgitation.  11. Mild tricuspid regurgitation.  12. Estimated pulmonary artery systolic pressure is 38.3 mmHg assuming a right atrial pressure of 3 mmHg, which is consistent with borderline pulmonary hypertension.  13. Trivial pericardial effusion.    MD Cuco Electronically signed on 2021 at 8:31:26 AM  *** Final ***      CONNIE FLANNERY MD; Attending Cardiologist  This document has been electronically signed. May 11 2021  8:01PM    < end of copied text >      ASSESSMENT AND PLAN:           -consent to be obtained  -procedure discussed with patient; risks and benefits explained, questions answered  -labs and ECG reviewed                                                        Department of Cardiology                                         [facility]/[place] [address] [address] [facility]                                 Telephone: [phone]. Fax:[phone]                                         Pre Procedure Cardiac Cath NP Note      Narrative:      46 y/o obese male with no significant PMHx , on no medicines came to the ER for exertional dyspnea for the past few days. pt. works in a bakery.  Patient was negative for DVT in right lower extremity CTA chest showed Bilateral multifocal lobar and segmental pulmonary emboli with associated right ventricular strain. Was started on heparin gtt. Echo showed severely reduced RV systolic function. Dow sign (RV apical hyperkinesis with RV free wall akinesis) is seen. Finding suggests acute PE. EF 65 - 70%.     Patient is now scheduled for EKOS procedure with Dr. Moulton.       ASA:  2  Mallampati:  2   Bleeding Risk:     Creatinine:   0.90  GFR:  103   	  MEDICATIONS:  amLODIPine   Tablet 5 milliGRAM(s) Oral daily  losartan 100 milliGRAM(s) Oral daily  ALBUTerol    0.083% 2.5 milliGRAM(s) Nebulizer every 6 hours PRN  alteplase    Infusion 1 mG/Hr IntraCatheter.. <Continuous>  alteplase    Infusion 1 mG/Hr IntraCatheter.. <Continuous>  heparin   Injectable 9000 Unit(s) IV Push every 6 hours PRN  heparin   Injectable 4500 Unit(s) IV Push every 6 hours PRN  heparin  Infusion. 1600 Unit(s)/Hr IV Continuous <Continuous>  potassium chloride  10 mEq/100 mL IVPB 10 milliEquivalent(s) IV Intermittent every 1 hour                   12.5   9.93  )-----------( 206      ( 12 May 2021 07:31 )             40.6         143  |  105  |  11.0  ----------------------------<  102<H>  3.4<L>   |  25.0  |  0.90    Ca    9.7      12 May 2021 06:35    TPro  8.0  /  Alb  4.1  /  TBili  0.6  /  DBili  x   /  AST  56<H>  /  ALT  96<H>  /  AlkPhos  76        PHYSICAL EXAM:    T(C): 36.9 (21 @ 11:45), Max: 36.9 (21 @ 21:36)  HR: 94 (21 @ 14:00) (89 - 107)  BP: 132/92 (21 @ 14:00) (109/98 - 157/105)  RR: 16 (21 @ 14:00) (13 - 25)  SpO2: 95% (21 @ 14:00) (90% - 97%)      I&O's Summary    12 May 2021 07:01  -  12 May 2021 15:38  --------------------------------------------------------  IN: 248 mL / OUT: 300 mL / NET: -52 mL        Daily Weight in k.2 (12 May 2021 11:45)    Constitutional: A & O x 3  HEENT:   Normal oral mucosa, PERRL, EOMI	  Cardiovascular: Normal S1 S2, No JVD, No murmurs, No edema  Respiratory: Lungs clear to auscultation	  Gastrointestinal:  Soft, Non-tender, + BS	  Skin: No rashes, No ecchymoses, No cyanosis  Neurologic: Non-focal  Extremities: Normal range of motion, No clubbing, cyanosis or edema  Vascular: Peripheral pulses palpable 2+ bilaterally    TELEMETRY: 	    ECG:  	  < from: 12 Lead ECG (21 @ 11:11) >  Sinus tachycardia  Otherwise normal ECG    Confirmed by ROBERT MAX ([id number]) on 2021 11:28:57 PM    < end of copied text >    RADIOLOGY:   DIAGNOSTIC TESTING:  [ X] Echocardiogram:  < from: TTE Echo Complete w/o Contrast w/ Doppler (21 @ 20:01) >  Summary:   1. Technically adequate study.   2. Normal global left ventricular systolic function.   3. Left ventricular ejection fraction, by visual estimation, is 65 to 70%.   4. The left ventricular diastolic function could not be assessed in this study.   5. There is moderate concentric left ventricular hypertrophy.   6. Moderately enlarged right ventricle.   7. Severely reduced RV systolic function. Dow sign (RV apical hyperkinesis with RV free wall akinesis) is seen. Finding suggests acute PE.   8. Mildly enlarged right atrium.   9. Normal left atrial size.  10. Trace mitral valve regurgitation.  11. Mild tricuspid regurgitation.  12. Estimated pulmonary artery systolic pressure is 38.3 mmHg assuming a right atrial pressure of 3 mmHg, which is consistent with borderline pulmonary hypertension.  13. Trivial pericardial effusion.    MD Cuco Electronically signed on 2021 at 8:31:26 AM  *** Final ***      CONNIE FLANNERY MD; Attending Cardiologist  This document has been electronically signed. May 11 2021  8:01PM    < end of copied text >      ASSESSMENT AND PLAN:     46 y/o obese male with no significant PMHx , on no medicines came to the ER for exertional dyspnea for the past few days. pt. works in a bakery.  Patient was negative for DVT in right lower extremity CTA chest showed Bilateral multifocal lobar and segmental pulmonary emboli with associated right ventricular strain. Was started on heparin gtt. Echo showed severely reduced RV systolic function. Dow sign (RV apical hyperkinesis with RV free wall akinesis) is seen. Finding suggests acute PE. EF 65 - 70%. Patient is now scheduled for EKOS procedure with Dr. Moulton.       -consent obtained by attending   -procedure discussed with patient; risks and benefits explained, questions answered  -labs and ECG reviewed  -pts K+ 3.4 now getting total 3 KCL 10 mEq riders  -heparin 500 units per each catheter   -tPA 1 mg/ hr per catheter (bag 1 and bag 2 ordered) started in lab  -Q 4 hours lab work per protocol CBC / PTT / Fibrinogen   -will need ECHO prior to discharge

## 2021-05-12 NOTE — PROGRESS NOTE ADULT - ASSESSMENT
pt. is a 44 y/o obese male with no significant PMHx , on no medicines came to the ER for exertional dyspnea for the past few days. Patient found to have acute bilateral PE with rv strain      Problem/Plan - 1:  ·  Problem: Bilateral pulmonary embolism.  Plan: pt's ct angio with Bilateral multifocal lobar and segmental pulmonary emboli with associated right ventricular strain.  - spoke to cardio , EKOS eval today  - consulted [facility] today      Problem/Plan - 2:  ·  Problem: Obesity, Class II, BMI 35-39.9, isolated.  Plan: pt. will be benefit from loosing wt. discussed with pt.      Problem/Plan - 3:  ·  Problem: Transaminitis.  trend      Problem/Plan - 4:  ·  Problem: Elevated blood-pressure reading without diagnosis of hypertension.    - losartan increased  c.w norvasc     Problem/Plan - 5:  ·  Problem: Hypokalemia.  replete    discussed with rn and pa

## 2021-05-12 NOTE — CONSULT NOTE ADULT - SUBJECTIVE AND OBJECTIVE BOX
[place] [facility]                                                        Office: [address] [address]                                                       Telephone: [phone]. Fax:[phone]    CARDIOLOGY CONSULTATION NOTE                                                                                             History obtained by: Patient and medical record   obtained: No    HPI:  46 y/o obese male with no PMHx  came to the ER for exertional dyspnea since Monday AM.  SOB increases with activity and decreases at rest. Patient report first time having this symptom. Denies any CP, palpitations, LOC, fever, cough, N/V/D. no sick contact, covid-19 negative in the ER (no hx of COVID +), patient didn't received Covid vaccine, no abd. pain, no black stool or hematemesis/ hemoptysis reported. Patient denies recent travel, sedentary, hx of recent surgery or fx, malignancy, bleeding or clotting hx, hx of DVT    REVIEW OF SYMPTOMS:   Cardiovascular:  No chest pain,   No fatigue, No syncope,  No palpitations, No dizziness, No Orthopnea, No Paroxsymal nocturnal dyspnea.  Respiratory:  + Mild Dyspnea, No cough.  Genitourinary:  No dysuria, no hematuria.  Gastrointestinal:  No nausea, no vomiting. No diarrhea.  No abdominal pain. No dark color stool, no melena.  Neurological: No headache, no dizziness, no slurred speech.  Psychiatric: No agitation, no anxiety.    ALL OTHER REVIEW OF SYSTEMS ARE NEGATIVE.    Allergies  No Known Allergies    CURRENT MEDICATIONS:  amLODIPine   Tablet 5 milliGRAM(s) Oral daily  heparin  Infusion.    Home Medications:  None    Past Medical History  No pertinent past medical history    Past Surgical History  No significant past surgical history    FAMILY HISTORY:  No pertinent family history in first degree relatives    Social History:  Denies ever smoking, alcohol or drugs use:    Vital Signs Last 24 Hrs  T(C): 36.9 (12 May 2021 00:11), Max: 37.1 (11 May 2021 11:04)  T(F): 98.5 (12 May 2021 00:11), Max: 98.7 (11 May 2021 11:04)  HR: 99 (12 May 2021 00:11) (89 - 101)  BP: 137/98 (12 May 2021 00:11) (137/98 - 155/128)  RR: 20 (12 May 2021 00:11) (20 - 20)  SpO2: 90% (12 May 2021 00:11) (90% - 94%)    PHYSICAL EXAM:  Constitutional: Comfortable . No acute distress.   HEENT: Atraumatic and normcephalic , neck is supple . no JVD.  PEERL   CNS: A&O x3. No focal neurologic deficits.   Respiratory: CTAB. No wheezing, rhonchi or crackles   Cardiovascular: S1S2 RRR. No murmur or rubs  Gastrointestinal: Soft non-tender and non distended . +Bowel sounds.   Extremities: No pitting  edema x 4 extremities  Psychiatric: Calm . no agitation.    LABS:                        12.1   10.82 )-----------( 195      ( 11 May 2021 23:24 )             39.3     05-11    143  |  106  |  14.0  ----------------------------<  88  3.2<L>   |  24.0  |  0.95    Ca    9.9      11 May 2021 12:38    TPro  7.5  /  Alb  4.2  /  TBili  0.4  /  DBili  x   /  AST  99<H>  /  ALT  114<H>  /  AlkPhos  78  05-11  D-Dimer Assay, Quantitative: 1886 ng/mL DDU (05.11.21 @ 12:38)   CARDIAC MARKERS ( 11 May 2021 23:18 )  x     / 0.05 ng/mL / x     / x     / x      CARDIAC MARKERS ( 11 May 2021 12:38 )  x     / 0.10 ng/mL / x     / x     / x        ;p-BNP=Serum Pro-Brain Natriuretic Peptide: 1704 pg/mL (05-11 @ 12:38)    PT/INR - ( 11 May 2021 12:38 )   PT: 13.5 sec;   INR: 1.17 ratio    PTT - ( 11 May 2021 23:24 )  PTT:149.0 sec    EKG: Reviewed by me.     RADIOLOGY & ADDITIONAL STUDIES:     X-ray:  reviewed by me.     CT scan:     ECHO FINDINGS:     STRESS  FINDINGS:   Bilateral multifocal lobar and segmental pulmonary emboli with associated right ventricular strain.  pt. has been started on heparin drip in the ER. Echo is requested.  (11 May 2021 20:47)  CATHETERIZATION FINDINGS:  Date:  Preliminary evaluation, please await official recommendations by Dr. Lomax       Assessment and recommendations are final when note is signed by the attending.                                                                        [place] [facility]                                                        Office: [address] [address]                                                       Telephone: [phone]. Fax:[phone]    CARDIOLOGY CONSULTATION NOTE                                                                                             History obtained by: Patient and medical record   obtained: No    HPI:  44 y/o obese male with no PMHx  came to the ER for exertional dyspnea since Monday AM.  SOB increases with activity and decreases at rest. Patient report first time having this symptom. Denies any CP, palpitations, LOC, fever, cough, N/V/D. no sick contact, covid-19 negative in the ER (no hx of COVID +), patient didn't received Covid vaccine, no abd. pain, no black stool or hematemesis/ hemoptysis reported. Patient denies recent travel, sedentary, hx of recent surgery or fx, malignancy, bleeding or clotting hx, hx of DVT    Of note: Patient states last time he visited a Dr was 3-4 years ago and "everything was normal"     REVIEW OF SYMPTOMS:   Cardiovascular:  No chest pain,   No fatigue, No syncope,  No palpitations, No dizziness, No Orthopnea, No Paroxsymal nocturnal dyspnea.  Respiratory:  + Mild Dyspnea, No cough.  Genitourinary:  No dysuria, no hematuria.  Gastrointestinal:  No nausea, no vomiting. No diarrhea.  No abdominal pain. No dark color stool, no melena.  Neurological: No headache, no dizziness, no slurred speech.  Psychiatric: No agitation, no anxiety.    ALL OTHER REVIEW OF SYSTEMS ARE NEGATIVE.    Allergies  No Known Allergies    CURRENT MEDICATIONS:  amLODIPine   Tablet 5 milliGRAM(s) Oral daily  heparin  Infusion.    Home Medications:  None    Past Medical History  No pertinent past medical history    Past Surgical History  No significant past surgical history    FAMILY HISTORY:  No pertinent family history in first degree relatives    Social History:  Denies ever smoking, alcohol or drugs use:    Vital Signs Last 24 Hrs  T(C): 36.9 (12 May 2021 00:11), Max: 37.1 (11 May 2021 11:04)  T(F): 98.5 (12 May 2021 00:11), Max: 98.7 (11 May 2021 11:04)  HR: 99 (12 May 2021 00:11) (89 - 101)  BP: 137/98 (12 May 2021 00:11) (137/98 - 155/128)  RR: 20 (12 May 2021 00:11) (20 - 20)  SpO2: 90% (12 May 2021 00:11) (90% - 94%)    PHYSICAL EXAM:  Constitutional: Comfortable . No acute distress.   HEENT: Atraumatic and normcephalic , neck is supple . no JVD.  PEERL   CNS: A&O x3. No focal neurologic deficits.   Respiratory: CTAB. No wheezing, rhonchi or crackles   Cardiovascular: S1S2 RRR. No murmur or rubs  Gastrointestinal: Soft non-tender and non distended . +Bowel sounds.   Extremities: No pitting  edema x 4 extremities  Psychiatric: Calm . no agitation.    LABS:                        12.1   10.82 )-----------( 195      ( 11 May 2021 23:24 )             39.3     05-11    143  |  106  |  14.0  ----------------------------<  88  3.2<L>   |  24.0  |  0.95    Ca    9.9      11 May 2021 12:38    TPro  7.5  /  Alb  4.2  /  TBili  0.4  /  DBili  x   /  AST  99<H>  /  ALT  114<H>  /  AlkPhos  78  05-11  D-Dimer Assay, Quantitative: 1886 ng/mL DDU (05.11.21 @ 12:38)   CARDIAC MARKERS ( 11 May 2021 23:18 )  x     / 0.05 ng/mL / x     / x     / x      CARDIAC MARKERS ( 11 May 2021 12:38 )  x     / 0.10 ng/mL / x     / x     / x        ;p-BNP=Serum Pro-Brain Natriuretic Peptide: 1704 pg/mL (05-11 @ 12:38)    PT/INR - ( 11 May 2021 12:38 )   PT: 13.5 sec;   INR: 1.17 ratio    PTT - ( 11 May 2021 23:24 )  PTT:149.0 sec    EKG: + Sinus tachycardia. No acute ST or T waves abnormalities. No previous EKG to compare     RADIOLOGY & ADDITIONAL STUDIES:    CT Angio Chest w/ IV Cont (05.11.21 @ 17:45) >  IMPRESSION:  Bilateral multifocal lobar and segmental pulmonary emboli with associated right ventricular strain.  Findings were discussed with Dr. BOBBY CONRAD [id number] 5/11/2021 6:33 PM by Dr. Ulloa with read back confirmation.  < end of copied text >    US Duplex Venous Lower Ext Ltd, Right (05.11.21 @ 16:00) >  IMPRESSION:  No evidence of right lower extremity deep venous thrombosis.  < end of copied text >    Xray Chest 1 View-PORTABLE IMMEDIATE (Xray Chest 1 View-PORTABLE IMMEDIATE .) (05.11.21 @ 15:08) >  IMPRESSION: Clear lungs at this time. Probable heart enlargement and prominent superior mediastinum are unchanged.  < end of copied text >    Preliminary evaluation, please await official recommendations   Assessment and recommendations are final when note is signed by the attending.

## 2021-05-12 NOTE — CONSULT NOTE ADULT - ATTENDING COMMENTS
pt was seen and examined  plan of care DW NP overnight.  Pt with SOB since monday, no COVID or COVID vaccine  No hx of DVT/ PE.  Pt with bilateral PE, on heparin. TTE done, will review.  BP is high and HR in 90's. No need for TPA now.  pt will need hematological evaluation.   no DVT seen on Venous Doppler.  I will follow with you.

## 2021-05-12 NOTE — PROGRESS NOTE ADULT - SUBJECTIVE AND OBJECTIVE BOX
REASON FOR CONSULT: s/p EKOS for significant bilateral pulmonary embolisms    CONSULT REQUESTED BY: Cardiologist Dr. Moulton    Patient is a 45y old Male who presents with a chief complaint of Pulmonary embolism (12 May 2021 16:25)      BRIEF HOSPITAL COURSE: Patient was admitted yesterday 5/11 to medicine service and started on heparin drip for segmental and subsegmental bilateral PEs. He had an echocardiogram done 5/11 which showed EF 65-70% with normal LV, but RV enlarged with severely reduced RV systolic dysfunction. Apical hyperkinesis and free wall akinesis.   Decision made for EKOS and patient taken to cath lab 5/12 for bilateral femoral catheters.   Now admitted to ICU for critical care monitoring.       PAST MEDICAL & SURGICAL HISTORY:  No pertinent past medical history  No significant past surgical history  Although in ED he has signficant hypertension and has been started on norvasc and losartan    Allergies  No Known Allergies  Intolerances  none      FAMILY HISTORY:  No pertinent family history in first degree relatives      Review of Systems:  CONSTITUTIONAL:  fever and fatigue prior to admission  EYES: No eye pain, visual disturbances, or discharge  ENMT:  No difficulty hearing, tinnitus, vertigo; No sinus or throat pain  NECK: No pain or stiffness  RESPIRATORY: +SOB, DIXON  CARDIOVASCULAR: + chest pain with deep breaths  GASTROINTESTINAL: No abdominal or epigastric pain. No nausea, vomiting, or hematemesis; No diarrhea or constipation. No melena or hematochezia.  GENITOURINARY: No dysuria, frequency, hematuria, or incontinence  NEUROLOGICAL: No headaches, memory loss, loss of strength, numbness, or tremors  SKIN: No itching, burning, rashes, or lesions   MUSCULOSKELETAL: No joint pain or swelling; No muscle, back, or extremity pain  PSYCHIATRIC: No depression, anxiety, mood swings, or difficulty sleeping    Medications:  amLODIPine   Tablet 5 milliGRAM(s) Oral daily  losartan 100 milliGRAM(s) Oral daily  ALBUTerol    0.083% 2.5 milliGRAM(s) Nebulizer every 6 hours PRN    VIA FEMORAL CATHETERS  alteplase    Infusion 1 mG/Hr IntraCatheter.. <Continuous>  alteplase    Infusion 1 mG/Hr IntraCatheter.. <Continuous>  heparin  Infusion 500 Unit(s)/Hr IV Continuous <Continuous>    supplemented  potassium chloride  10 mEq/100 mL IVPB 10 milliEquivalent(s) IV Intermittent every 1 hour  sodium chloride 0.9%. 1000 milliLiter(s) IV Continuous <Continuous>      ICU Vital Signs Last 24 Hrs  T(C): 36.7 (12 May 2021 17:00), Max: 36.9 (11 May 2021 21:36)  T(F): 98.1 (12 May 2021 17:00), Max: 98.5 (11 May 2021 21:36)  HR: 94 (12 May 2021 17:00) (89 - 107)  BP: 149/112 (12 May 2021 17:00) (109/98 - 157/105)  BP(mean): 124 (12 May 2021 17:00) (101 - 124)  RR: 21 (12 May 2021 17:00) (13 - 25)  SpO2: 97% (12 May 2021 17:00) (90% - 97%)      I&O's Detail    12 May 2021 07:01  -  12 May 2021 17:30  --------------------------------------------------------  IN:    Heparin Infusion: 48 mL    IV PiggyBack: 200 mL  Total IN: 248 mL    OUT:    Voided (mL): 300 mL  Total OUT: 300 mL    Total NET: -52 mL      LABS:                        12.5   9.93  )-----------( 206      ( 12 May 2021 07:31 )             40.6     05-12    143  |  105  |  11.0  ----------------------------<  102<H>  3.4<L>   |  25.0  |  0.90    Ca    9.7      12 May 2021 06:35    TPro  8.0  /  Alb  4.1  /  TBili  0.6  /  DBili  x   /  AST  56<H>  /  ALT  96<H>  /  AlkPhos  76  05-12      CARDIAC MARKERS ( 12 May 2021 06:35 )  x     / 0.06 ng/mL / x     / x     / x      CARDIAC MARKERS ( 11 May 2021 23:18 )  x     / 0.05 ng/mL / x     / x     / x      CARDIAC MARKERS ( 11 May 2021 12:38 )  x     / 0.10 ng/mL / x     / x     / x        PT/INR - ( 11 May 2021 12:38 )   PT: 13.5 sec;   INR: 1.17 ratio    PTT - ( 12 May 2021 14:35 )  PTT:56.5 sec    CULTURES: negative      Physical Examination: Discussion done using [employer]: [phone], all questions by patient were answered, he was comfortable, laying in ICU bed. Understands what procedure was done and why. He has no complaints at present, denies pain  General: No acute distress.  Alert, oriented, interactive, nonfocal  HEENT: Pupils equal, reactive to light.  Symmetric.  PULM: Clear to auscultation bilaterally, no significant sputum production  CVS: Regular rate and rhythm, no murmurs, rubs, or gallops  ABD: Soft, nondistended, nontender, normoactive bowel sounds, no masses  EXT: No edema, nontender  Bilateral groins have catheter inserted, sites are soft, nontender. The left site has a small amount of blood on guaze, that nursing has marked to monitor, size is only about pea sized  SKIN: Warm and well perfused, no rashes noted.      Case Discussed with Dr. Crystal Whitaker      CRITICAL CARE TIME SPENT: 35 minutes

## 2021-05-12 NOTE — PROGRESS NOTE ADULT - SUBJECTIVE AND OBJECTIVE BOX
JUAN RAMOS    [id number]    45y      Male    INTERVAL HPI/OVERNIGHT EVENTS: patient being seen for acute pe with rv strain.     patient seen at bedside with . PAtient states feeling ok.     REVIEW OF SYSTEMS:    CONSTITUTIONAL: No fever, weight loss, or fatigue  RESPIRATORY: No cough, wheezing, hemoptysis; No shortness of breath  CARDIOVASCULAR: No chest pain, palpitations  GASTROINTESTINAL: No abdominal or epigastric pain. No nausea, vomiting  NEUROLOGICAL: No headaches, memory loss, loss of strength.  MISCELLANEOUS:      Vital Signs Last 24 Hrs  T(C): 36.6 (12 May 2021 07:38), Max: 37.1 (11 May 2021 11:04)  T(F): 97.8 (12 May 2021 07:38), Max: 98.7 (11 May 2021 11:04)  HR: 96 (12 May 2021 07:38) (89 - 101)  BP: 157/105 (12 May 2021 07:49) (137/98 - 157/105)  BP(mean): --  RR: 20 (12 May 2021 07:38) (20 - 20)  SpO2: 94% (12 May 2021 07:38) (90% - 94%)    PHYSICAL EXAM:    GENERAL: NAD, pleasant   HEENT: PERRL, +EOMI  NECK: soft, Supple, No JVD,   CHEST/LUNG: Clear to auscultation bilaterally; No wheezing  HEART: S1S2+, Regular rate and rhythm; No murmurs, rubs, or gallops  ABDOMEN: Soft, Nontender, Nondistended; Bowel sounds present  EXTREMITIES:  2+ Peripheral Pulses, No clubbing, cyanosis, or edema  SKIN: No rashes or lesions  NEURO: AAOX3, no focal deficits,  PSYCH: normal mood      LABS:                        12.5   9.93  )-----------( 206      ( 12 May 2021 07:31 )             40.6     05-12    143  |  105  |  11.0  ----------------------------<  102<H>  3.4<L>   |  25.0  |  0.90    Ca    9.7      12 May 2021 06:35    TPro  8.0  /  Alb  4.1  /  TBili  0.6  /  DBili  x   /  AST  56<H>  /  ALT  96<H>  /  AlkPhos  76  05-12    PT/INR - ( 11 May 2021 12:38 )   PT: 13.5 sec;   INR: 1.17 ratio         PTT - ( 12 May 2021 07:31 )  PTT:70.7 sec        MEDICATIONS  (STANDING):  amLODIPine   Tablet 5 milliGRAM(s) Oral daily  heparin  Infusion. 1600 Unit(s)/Hr (16 mL/Hr) IV Continuous <Continuous>  losartan 100 milliGRAM(s) Oral daily  potassium chloride  10 mEq/100 mL IVPB 10 milliEquivalent(s) IV Intermittent every 1 hour    MEDICATIONS  (PRN):  ALBUTerol    0.083% 2.5 milliGRAM(s) Nebulizer every 6 hours PRN Shortness of Breath and/or Wheezing  heparin   Injectable 9000 Unit(s) IV Push every 6 hours PRN For aPTT less than 40  heparin   Injectable 4500 Unit(s) IV Push every 6 hours PRN For aPTT between 40 - 57      RADIOLOGY & ADDITIONAL TESTS:

## 2021-05-12 NOTE — CONSULT NOTE ADULT - ASSESSMENT
Problem: Bilateral pulmonary embolism.  Plan: pt's ct angio with Bilateral multifocal lobar and segmental pulmonary emboli with associated right ventricular strain.  clinically pt. stable and not in acute respiratory distress, ICu consult appreciated, pt. will be on heparin drip. o2 support  and albuterol neb as needed. echo to follow, trop to trend 1st 0.10, bnp 1704. case discussed with cardiologist Dr. Powell.   Pt's PE appears unprovoked, pt. will benefit from hematology follow up as an out-pt for genetic testing for hypercoagulability.    46 y/o obese male with no PMHx  came to the ER for exertional dyspnea since Monday AM.

## 2021-05-13 LAB
ALBUMIN SERPL ELPH-MCNC: 3.6 G/DL — SIGNIFICANT CHANGE UP (ref 3.3–5.2)
ALP SERPL-CCNC: 66 U/L — SIGNIFICANT CHANGE UP (ref 40–120)
ALT FLD-CCNC: 64 U/L — HIGH
ANION GAP SERPL CALC-SCNC: 11 MMOL/L — SIGNIFICANT CHANGE UP (ref 5–17)
APTT BLD: 51 SEC — HIGH (ref 27.5–35.5)
APTT BLD: 56.9 SEC — HIGH (ref 27.5–35.5)
APTT BLD: 64.3 SEC — HIGH (ref 27.5–35.5)
AST SERPL-CCNC: 40 U/L — HIGH
BILIRUB SERPL-MCNC: 0.7 MG/DL — SIGNIFICANT CHANGE UP (ref 0.4–2)
BUN SERPL-MCNC: 8 MG/DL — SIGNIFICANT CHANGE UP (ref 8–20)
CALCIUM SERPL-MCNC: 8.8 MG/DL — SIGNIFICANT CHANGE UP (ref 8.6–10.2)
CHLORIDE SERPL-SCNC: 108 MMOL/L — HIGH (ref 98–107)
CO2 SERPL-SCNC: 24 MMOL/L — SIGNIFICANT CHANGE UP (ref 22–29)
CREAT SERPL-MCNC: 0.75 MG/DL — SIGNIFICANT CHANGE UP (ref 0.5–1.3)
GLUCOSE SERPL-MCNC: 91 MG/DL — SIGNIFICANT CHANGE UP (ref 70–99)
HCT VFR BLD CALC: 39.2 % — SIGNIFICANT CHANGE UP (ref 39–50)
HCT VFR BLD CALC: 42.5 % — SIGNIFICANT CHANGE UP (ref 39–50)
HCT VFR BLD CALC: 46 % — SIGNIFICANT CHANGE UP (ref 39–50)
HGB BLD-MCNC: 12.2 G/DL — LOW (ref 13–17)
HGB BLD-MCNC: 13 G/DL — SIGNIFICANT CHANGE UP (ref 13–17)
HGB BLD-MCNC: 14 G/DL — SIGNIFICANT CHANGE UP (ref 13–17)
INR BLD: 1.19 RATIO — HIGH (ref 0.88–1.16)
MAGNESIUM SERPL-MCNC: 1.8 MG/DL — SIGNIFICANT CHANGE UP (ref 1.6–2.6)
MCHC RBC-ENTMCNC: 21.4 PG — LOW (ref 27–34)
MCHC RBC-ENTMCNC: 21.4 PG — LOW (ref 27–34)
MCHC RBC-ENTMCNC: 21.6 PG — LOW (ref 27–34)
MCHC RBC-ENTMCNC: 30.4 GM/DL — LOW (ref 32–36)
MCHC RBC-ENTMCNC: 30.6 GM/DL — LOW (ref 32–36)
MCHC RBC-ENTMCNC: 31.1 GM/DL — LOW (ref 32–36)
MCV RBC AUTO: 69.4 FL — LOW (ref 80–100)
MCV RBC AUTO: 70 FL — LOW (ref 80–100)
MCV RBC AUTO: 70.4 FL — LOW (ref 80–100)
PHOSPHATE SERPL-MCNC: 3.4 MG/DL — SIGNIFICANT CHANGE UP (ref 2.4–4.7)
PLATELET # BLD AUTO: 173 K/UL — SIGNIFICANT CHANGE UP (ref 150–400)
PLATELET # BLD AUTO: 184 K/UL — SIGNIFICANT CHANGE UP (ref 150–400)
PLATELET # BLD AUTO: 195 K/UL — SIGNIFICANT CHANGE UP (ref 150–400)
POTASSIUM SERPL-MCNC: 3.9 MMOL/L — SIGNIFICANT CHANGE UP (ref 3.5–5.3)
POTASSIUM SERPL-SCNC: 3.9 MMOL/L — SIGNIFICANT CHANGE UP (ref 3.5–5.3)
PROT SERPL-MCNC: 7.2 G/DL — SIGNIFICANT CHANGE UP (ref 6.6–8.7)
PROTHROM AB SERPL-ACNC: 13.7 SEC — HIGH (ref 10.6–13.6)
RBC # BLD: 5.65 M/UL — SIGNIFICANT CHANGE UP (ref 4.2–5.8)
RBC # BLD: 6.07 M/UL — HIGH (ref 4.2–5.8)
RBC # BLD: 6.53 M/UL — HIGH (ref 4.2–5.8)
RBC # FLD: 18.3 % — HIGH (ref 10.3–14.5)
RBC # FLD: 18.6 % — HIGH (ref 10.3–14.5)
RBC # FLD: 18.9 % — HIGH (ref 10.3–14.5)
SODIUM SERPL-SCNC: 143 MMOL/L — SIGNIFICANT CHANGE UP (ref 135–145)
WBC # BLD: 10.01 K/UL — SIGNIFICANT CHANGE UP (ref 3.8–10.5)
WBC # BLD: 9.16 K/UL — SIGNIFICANT CHANGE UP (ref 3.8–10.5)
WBC # BLD: 9.24 K/UL — SIGNIFICANT CHANGE UP (ref 3.8–10.5)
WBC # FLD AUTO: 10.01 K/UL — SIGNIFICANT CHANGE UP (ref 3.8–10.5)
WBC # FLD AUTO: 9.16 K/UL — SIGNIFICANT CHANGE UP (ref 3.8–10.5)
WBC # FLD AUTO: 9.24 K/UL — SIGNIFICANT CHANGE UP (ref 3.8–10.5)

## 2021-05-13 PROCEDURE — 99233 SBSQ HOSP IP/OBS HIGH 50: CPT

## 2021-05-13 PROCEDURE — 99232 SBSQ HOSP IP/OBS MODERATE 35: CPT

## 2021-05-13 PROCEDURE — 93306 TTE W/DOPPLER COMPLETE: CPT | Mod: 26

## 2021-05-13 RX ORDER — HEPARIN SODIUM 5000 [USP'U]/ML
1800 INJECTION INTRAVENOUS; SUBCUTANEOUS
Qty: 25000 | Refills: 0 | Status: DISCONTINUED | OUTPATIENT
Start: 2021-05-13 | End: 2021-05-14

## 2021-05-13 RX ORDER — WARFARIN SODIUM 2.5 MG/1
10 TABLET ORAL ONCE
Refills: 0 | Status: COMPLETED | OUTPATIENT
Start: 2021-05-13 | End: 2021-05-13

## 2021-05-13 RX ORDER — HEPARIN SODIUM 5000 [USP'U]/ML
4500 INJECTION INTRAVENOUS; SUBCUTANEOUS EVERY 6 HOURS
Refills: 0 | Status: DISCONTINUED | OUTPATIENT
Start: 2021-05-13 | End: 2021-05-14

## 2021-05-13 RX ORDER — HEPARIN SODIUM 5000 [USP'U]/ML
9000 INJECTION INTRAVENOUS; SUBCUTANEOUS EVERY 6 HOURS
Refills: 0 | Status: DISCONTINUED | OUTPATIENT
Start: 2021-05-13 | End: 2021-05-14

## 2021-05-13 RX ADMIN — LOSARTAN POTASSIUM 100 MILLIGRAM(S): 100 TABLET, FILM COATED ORAL at 05:23

## 2021-05-13 RX ADMIN — HEPARIN SODIUM 2000 UNIT(S)/HR: 5000 INJECTION INTRAVENOUS; SUBCUTANEOUS at 16:20

## 2021-05-13 RX ADMIN — HEPARIN SODIUM 1800 UNIT(S)/HR: 5000 INJECTION INTRAVENOUS; SUBCUTANEOUS at 09:09

## 2021-05-13 RX ADMIN — HEPARIN SODIUM 2000 UNIT(S)/HR: 5000 INJECTION INTRAVENOUS; SUBCUTANEOUS at 22:31

## 2021-05-13 RX ADMIN — WARFARIN SODIUM 10 MILLIGRAM(S): 2.5 TABLET ORAL at 22:32

## 2021-05-13 RX ADMIN — HEPARIN SODIUM 4500 UNIT(S): 5000 INJECTION INTRAVENOUS; SUBCUTANEOUS at 16:20

## 2021-05-13 RX ADMIN — AMLODIPINE BESYLATE 5 MILLIGRAM(S): 2.5 TABLET ORAL at 05:23

## 2021-05-13 NOTE — PROGRESS NOTE ADULT - SUBJECTIVE AND OBJECTIVE BOX
On Admission  05-11-21 (2d)  HPI:  pt. is a 44 y/o obese male with no significant PMHx , on no medicines came to the ER for exertional dyspnea for the past few days. pt. works in a bakery. Denies any CP, palpitations, LOC, fever, cough, N/V/D. no sick contact,  no recent travel. covid-19 negative in the ER. no abd. pain, no black stool or hematemesis/ hemoptysis reported. pt. has reported some vague pain in RLE x 1 day but no pain at the time of admission. 02 sat 92-94 % RA. RLE doppler negative for dvt. pt's ct angio chest :  Bilateral multifocal lobar and segmental pulmonary emboli with associated right ventricular strain.  pt. has been started on heparin drip in the ER. Echo is requested.  (11 May 2021 20:47)    PAST MEDICAL & SURGICAL HISTORY:  No pertinent past medical history    No significant past surgical history        Antimicrobial:    Cardiovascular:  amLODIPine   Tablet 5 milliGRAM(s) Oral daily  losartan 100 milliGRAM(s) Oral daily    Pulmonary:  ALBUTerol    0.083% 2.5 milliGRAM(s) Nebulizer every 6 hours PRN    Hematalogic:  heparin   Injectable 9000 Unit(s) IV Push every 6 hours PRN  heparin   Injectable 4500 Unit(s) IV Push every 6 hours PRN  heparin  Infusion. 1800 Unit(s)/Hr IV Continuous <Continuous>  warfarin 10 milliGRAM(s) Oral once    Other:  sodium chloride 0.9%. 1000 milliLiter(s) IV Continuous <Continuous>  sodium chloride 0.9%. 1000 milliLiter(s) IV Continuous <Continuous>      Drug Dosing Weight  Height (cm): 170 (11 May 2021 14:18)  Weight (kg): 110.7 (11 May 2021 14:18)  BMI (kg/m2): 38.3 (11 May 2021 14:18)  BSA (m2): 2.2 (11 May 2021 14:18)    T(C): 36.8 (05-13-21 @ 07:24), Max: 37.3 (05-12-21 @ 23:43)  HR: 91 (05-13-21 @ 10:00)  BP: 137/98 (05-13-21 @ 10:00)  BP(mean): 111 (05-13-21 @ 10:00)  ABP: --  ABP(mean): --  RR: 28 (05-13-21 @ 10:00)  SpO2: 94% (05-13-21 @ 10:00)          05-12 @ 07:01  -  05-13 @ 07:00  --------------------------------------------------------  IN: 2130 mL / OUT: 1950 mL / NET: 180 mL              LABS:  CBC Full  -  ( 13 May 2021 03:11 )  WBC Count : 9.24 K/uL  RBC Count : 5.65 M/uL  Hemoglobin : 12.2 g/dL  Hematocrit : 39.2 %  Platelet Count - Automated : 184 K/uL  Mean Cell Volume : 69.4 fl  Mean Cell Hemoglobin : 21.6 pg  Mean Cell Hemoglobin Concentration : 31.1 gm/dL  Auto Neutrophil # : x  Auto Lymphocyte # : x  Auto Monocyte # : x  Auto Eosinophil # : x  Auto Basophil # : x  Auto Neutrophil % : x  Auto Lymphocyte % : x  Auto Monocyte % : x  Auto Eosinophil % : x  Auto Basophil % : x    05-13    143  |  108<H>  |  8.0  ----------------------------<  91  3.9   |  24.0  |  0.75    Ca    8.8      13 May 2021 07:13  Phos  3.4     05-13  Mg     1.8     05-13    TPro  7.2  /  Alb  3.6  /  TBili  0.7  /  DBili  x   /  AST  40<H>  /  ALT  64<H>  /  AlkPhos  66  05-13    PT/INR - ( 11 May 2021 12:38 )   PT: 13.5 sec;   INR: 1.17 ratio         PTT - ( 13 May 2021 03:46 )  PTT:56.9 sec      ____________________________________________________________________________________________________

## 2021-05-13 NOTE — PROGRESS NOTE ADULT - ASSESSMENT
Brief summary of hospital stay:    Mr. Fletcher is 45 y.o Turkish speaking male with no reported pmh presents to ED on 05/11 with exertional dyspnea for the past few days with preceded RLE pain. He was found to have b/l  b/l multifocal lobar subsegmental PE with leak of troponin and RV strain on ECHO with negative doppler for DVT. Pt got tPA via cath. guided by EKOS. He is in stable condition post procedure. Post ECHO showed improved RV function. Hematologist consulted. Given uninsured status, pt was started on Warfarin by ICU team. He is downgraded to hospitalist  service on 5/13.     # Unprovoked multifocal lobar subsegmental PE with RV strain now s/p tPA via cath EKOS guided.  - c/w Heparin ggt  - started on Coumadin by ICU team, however pt doesn't have pcp and unclear who will f/u his INR at the moment, will discuss with SW/CCM options, since DOAC would be better medication for him   - Hematologist consult noted>> lupus panel, CEA, PSA ordered  - continue to wean off oxygen    # Mild microcytic Anemia most likely ROLLY due to occult blood losses  - FOBT ordered  - will check iron panel  - will need OP f/u    # HTN - c/w Amlodipin, Losartan    #DVT ppx - heparin ggt as above  # Code - full  # Dispo - will need to figure out oral AC and wean off oxygen, will need 2-3 days

## 2021-05-13 NOTE — PROGRESS NOTE ADULT - ASSESSMENT
HPI/INTERVAL EVENTS: EKOS catheters and sheaths pulled this morning.  No complaints     EXAM:       RADIOLOGY REVIEWED:      IMPRESSION/ASSESSMENT & PLAN:    HPI/INTERVAL EVENTS: EKOS catheters and sheaths pulled this morning.  No complaints     EXAM:   alert and oriented, nad  MMM  reg rhythm  lungs clear  abd soft  trace edema  skin warm and dry    RADIOLOGY REVIEWED:  ct chest- bilat segmental PE, no infiltrates     IMPRESSION/ASSESSMENT & PLAN:   44 yo male with hx of obesity, presented with unprovoked PE 5/11.  Underwent catheter directed therapy with EKOS, catheters were pulled in AM on 5/15.  - continue full dose heparin drip  - eventual transition to DOAC  - can be out of bed later today  will transfer out of MICU. HPI/INTERVAL EVENTS: EKOS catheters and sheaths pulled this morning.  No complaints     EXAM:   alert and oriented, nad  MMM  reg rhythm  lungs clear  abd soft  trace edema  skin warm and dry    RADIOLOGY REVIEWED:  ct chest- bilat segmental PE, no infiltrates     IMPRESSION/ASSESSMENT & PLAN:   44 yo male with hx of obesity, presented with unprovoked PE 5/11.  Underwent catheter directed therapy with EKOS, catheters were pulled in AM on 5/15.  - continue full dose heparin drip  - will transition to warfarin - patient is uninsured and cannot afford DOAC  - can be out of bed later today  will transfer out of MICU.

## 2021-05-13 NOTE — PROGRESS NOTE ADULT - ASSESSMENT
46 y/o obese male with no significant PMHx , on no medicines came to the ER for exertional dyspnea for the past few days. pt. works in a bakery.  Patient was negative for DVT in right lower extremity CTA chest showed Bilateral multifocal lobar and segmental pulmonary emboli with associated right ventricular strain. Was started on heparin gtt. Echo showed severely reduced RV systolic function. Dow sign (RV apical hyperkinesis with RV free wall akinesis) is seen. Finding suggests acute PE. EF 65 - 70%.     S/P TPA via catheter directed EKOS     S/P Removal of Femoral Sheaths    Pulses in the lower extremity are palpable bilat. The patient was in the supine position. The insertion site was identified.  Each #7 Icelandic femoral sheaths were then individually removed. Direct pressure was applied for  25 minutes each side until hemostasis was achieved. There was no hematoma, ecchymosis noted. Bilat LE DP pulses remained palpable.    Monitoring of the groin and both lower extremities including neuro-vascular checks and vital signs every 15 minutes x 4, then every 30 minutes x 2, then every 1 hour was ordered. Bedrest for 4 hours    Complications: None/Other  Pt tolerated well    Heparin increased to full AC. PTT pending  Wean O2 as tolerated  Pt is uninsured and reports he does not have resources for DOAC. Likely to start Coumadin tonight  Repeat echocardiogram today           46 y/o obese male with no significant PMHx , on no medicines came to the ER for exertional dyspnea for the past few days. pt. works in a bakery.  Patient was negative for DVT in right lower extremity CTA chest showed Bilateral multifocal lobar and segmental pulmonary emboli with associated right ventricular strain. Was started on heparin gtt. Echo showed severely reduced RV systolic function. Dow sign (RV apical hyperkinesis with RV free wall akinesis) is seen. Finding suggests acute PE. EF 65 - 70%.     S/P TPA via catheter directed EKOS     S/P Removal of Femoral Sheaths    Pulses in the lower extremity are palpable bilat. The patient was in the supine position. The insertion site was identified.  Each #7 Nepali femoral sheaths were then individually removed. Direct pressure was applied for  25 minutes each side until hemostasis was achieved. There was no hematoma, ecchymosis noted. Bilat LE DP pulses remained palpable.    Monitoring of the groin and both lower extremities including neuro-vascular checks and vital signs every 15 minutes x 4, then every 30 minutes x 2, then every 1 hour was ordered. Bedrest for 4 hours    Complications: None/Other  Pt tolerated well    Heparin increased to full AC. PTT pending  Wean O2 as tolerated  Pt is uninsured and reports he does not have resources for DOAC.   To start Coumadin tonight  Repeat echocardiogram today

## 2021-05-13 NOTE — CONSULT NOTE ADULT - ASSESSMENT
44 y/o obese male with no significant PMHx , on no medicines came to the ER for exertional dyspnea for few days, pt's ct angio with Bilateral multifocal lobar and segmental pulmonary emboli with associated right ventricular strain.    1) Pulmonary embolism with R heart strain  cardio/pulm on board  no indication for TPA  on Heparin gtt  no provoking factors so far, no recent travel, no COVID infection ( although patient does have + COVID ab and not been vaccinated), no hotmone use, no trauma or surgery as per noted.  will need hypercoaguable workup , this will be done as outpatient as this will not change inpatient management and can be false + in setting of active DVT/PE  check lupus anticoagulant panel, check CEA,PSA levels   will need atleast 6-12 months of AC.  when clinically stable transition to DOAC     2)Anemia  microcytic, check iron studies, hGb electrophoresis  if iron def recommend colonoscopy /EGD to r/o occult malignancy in setting of unprovoked PE    3) HTN - on meds, being managed by cardiology    will follow along 46 y/o obese male with no significant PMHx , on no medicines came to the ER for exertional dyspnea for few days, pt's ct angio with Bilateral multifocal lobar and segmental pulmonary emboli with associated right ventricular strain.    1) Pulmonary embolism with R heart strain  cardio/pulm on board  s/p TPA  on Heparin gtt  no provoking factors so far, no recent travel, no COVID infection ( although patient does have + COVID ab and not been vaccinated), no hotmone use, no trauma or surgery as per noted.  will need hypercoaguable workup , this will be done as outpatient as this will not change inpatient management and can be false + in setting of active DVT/PE  check lupus anticoagulant panel, check CEA,PSA levels   will need atleast 6-12 months of AC.  when clinically stable transition to DOAC   Patient may have insurance coverage issues , in that case can transition to coumadin with INR monitoring    2)Anemia  microcytic, check iron studies, hGb electrophoresis  if iron def recommend colonoscopy /EGD to r/o occult malignancy in setting of unprovoked PE    3) HTN - on meds, being managed by cardiology    will follow along

## 2021-05-13 NOTE — PROGRESS NOTE ADULT - SUBJECTIVE AND OBJECTIVE BOX
[place] [facility]                                                               Office: [address] [address]                                                              Telephone: [phone]. Fax:[phone]                                                                             PROGRESS NOTE  Reason for follow up: PE  Overnight: No new events.   Update: EKOS caths dc'd this am    Subjective: Via  " I feel better today. Can I go home today?"      Review of symptoms:   Cardiac:  No chest pain. No palpitations.  Respiratory: no cough. mild SOB  Gastrointestinal: No diarrhea. No abdominal pain. No bleeding.   Neuro: No focal neuro complaints.      Vitals:  T(C): 36.8 (21 @ 07:24), Max: 37.3 (21 @ 23:43)  HR: 82 (21 @ 08:00) (77 - 107)  BP: 129/87 (21 @ 08:00) (88/56 - 163/111)  RR: 20 (21 @ 08:00) (13 - 25)  SpO2: 97% (21 @ 08:00) (93% - 97%)  Wt(kg): --  I&O's Summary    12 May 2021 07:01  -  13 May 2021 07:00  --------------------------------------------------------  IN: 2130 mL / OUT: 1950 mL / NET: 180 mL      Weight (kg): 110.7 ( @ 14:18)      PHYSICAL EXAM:  Appearance: Comfortable. No acute distress  HEENT:  Atraumatic. Normocephalic.  Normal oral mucosa, PERRL, Neck is supple. No carotid bruit.   Neurologic: A & O x 3, no focal deficits. EOMI.  Cardiovascular: Normal S1 S2, No murmur, rubs/gallops. No JVD, No edema  Respiratory: Lungs clear to auscultation, unlabored   Gastrointestinal:  Soft, Non-tender, + BS  Lower Extremities: No edema  Psychiatry: Patient is calm. No agitation. Mood & affect appropriate  Skin: No rashes/ ecchymoses/cyanosis/ulcers visualized on the face, hands or feet.      CURRENT MEDICATIONS:  amLODIPine   Tablet 5 milliGRAM(s) Oral daily  losartan 100 milliGRAM(s) Oral daily    heparin  Infusion.  sodium chloride 0.9%.  sodium chloride 0.9%.      DIAGNOSTIC TESTING:  [x ] Echocardiogram: < from: TTE Echo Complete w/o Contrast w/ Doppler (21 @ 20:01) >  EXAM:  ECHO TTE WO CON COMP W DOPP      PROCEDURE DATE:  May 11 2021   .      INTERPRETATION:  REPORT:  TRANSTHORACIC ECHOCARDIOGRAM REPORT        Patient Name:   JUAN RAMOS Patient Location: [facility] Rec #:  [id number]      Accession #:     [id number]  Account #:      [id number]      Height:           66.9 in 170.0 cm  YOB: 1975     Weight:           242.5 lb 110.00 kg  Patient Age:    45 years      BSA:              2.19 m²  Patient Gender: M             BP:   155/128 mmHg      Date of Exam:        2021 8:01:59 PM  Sonographer:         Rosy Silva  Referring Physician: Cedric Hastings MD    Procedure:     2D Echo/Doppler/Color Doppler Complete.  Indications:   Dyspnea, unspecified - R06.00  Diagnosis:     Dyspnea, unspecified - R06.00  Study Details: Technically adequate study. Study quality was adversely affected                 due to body habitus.        2D AND M-MODE MEASUREMENTS (normal ranges within parentheses):  Left                 Normal   Aorta/Left            Normal  Ventricle:                    Atrium:  IVSd (2D):    1.42  (0.7-1.1) Left Atrium    3.42  (1.9-4.0)                 cm             (2D):           cm  LVPWd (2D):   1.38  (0.7-1.1) LA Volume      20.8    cm             Index         ml/m²  LVIDd (2D):   5.39  (3.4-5.7) Right Ventricle:                 cm             TAPSE:           1.25 cm  LVIDs (2D):   3.70                 cm  LV FS (2D):   31.4   (>25%)                  %  Relative Wall 0.51   (<0.42)  Thickness    LV SYSTOLIC FUNCTION BY 2D PLANIMETRY (MOD):  EF-A4C View: 65.8 % EF-A2C View: 74.6 % EF-Biplane: 71 %    LV DIASTOLIC FUNCTION:  e', MV Rajni: 0.10 m/s    SPECTRAL DOPPLER ANALYSIS (where applicable):  Aortic Valve: AoV Max Juarez: 0.86 m/s AoV Peak PG: 3.0 mmHg AoV Mean P.3 mmHg    LVOT Vmax: 0.66 m/s LVOT VTI: 0.103 m LVOT Diameter: 2.20 cm    AoV Area, Vmax: 2.89 cm² AoV Area, VTI: 3.30 cm² AoV Area, Vmn: 3.01 cm²  Ao VTI: 0.119  Tricuspid Valve and PA/RV Systolic Pressure: TR Max Velocity: 2.97 m/s RA Pressure: 3 mmHg RVSP/PASP: 38.3 mmHg      PHYSICIAN INTERPRETATION:  Left Ventricle: The left ventricular internal cavity size is normal.  Global LV systolic function was normal. Left ventricular ejection fraction, by visual estimation, is 65 to 70%. The left ventricular diastolic function could not be assessed in this study.  Right Ventricle: The right ventricular size is moderately enlarged. RV systolic function is severely reduced.  Left Atrium: Normal left atrial size.  Right Atrium: Mildly enlarged right atrium.  Pericardium: Trivial pericardial effusion is present.  Mitral Valve: The mitral valve is normal in structure. Mitral leaflet mobility is normal. Trace mitral valve regurgitation is seen.  TricuspidValve: The tricuspid valve is normal in structure. Mild tricuspid regurgitation is visualized. Estimated pulmonary artery systolic pressure is 38.3 mmHg assuming a right atrial pressure of 3 mmHg, which is consistent with borderline pulmonary hypertension.  Aortic Valve: The aortic valve is trileaflet. Peak transaortic gradient equals 3.0 mmHg, mean transaortic gradient equals 1.3 mmHg, the calculated aortic valve area equals 3.30 cm² by the continuity equation consistent with normally opening aortic valve. No evidence of aortic valve regurgitation is seen.  Pulmonic Valve: The pulmonic valve is normal. No indication of pulmonic valve regurgitation.  Aorta: The aortic root and ascending aorta are structurally normal, with no evidence of dilitation.  Pulmonary Artery: The main pulmonary artery is normal in size.  Venous: The inferior vena cava was normal sized, with respiratory size variation greater than 50%.      Summary:   1. Technically adequate study.   2. Normal global left ventricular systolic function.   3. Left ventricular ejection fraction, by visual estimation, is 65 to 70%.   4. The left ventricular diastolic function could not be assessed in this study.   5. There is moderate concentric left ventricular hypertrophy.   6. Moderately enlarged right ventricle.   7. Severely reduced RV systolic function. Dow sign (RV apical hyperkinesis with RV free wall akinesis) is seen. Finding suggests acute PE.   8. Mildly enlarged right atrium.   9. Normal left atrial size.  10. Trace mitral valve regurgitation.  11. Mild tricuspid regurgitation.  12. Estimated pulmonary artery systolic pressure is 38.3 mmHg assuming a right atrial pressure of 3 mmHg, which is consistent with borderline pulmonary hypertension.  13. Trivial pericardial effusion.    < end of copied text >       OTHER: 	  < from: CT Angio Chest w/ IV Cont (21 @ 17:45) >  EXAM:  CT ANGIO CHEST (W)AW IC                          PROCEDURE DATE:  2021          INTERPRETATION:  CLINICAL INFORMATION: Shortness of breath. High pretest probability of pulmonary embolism.    COMPARISON: None.    CONTRAST/COMPLICATIONS:  IV Contrast: Omnipaque 350  70 cc administered   30 cc discarded  Oral Contrast: NONE  Complications: None reported at time of study completion    PROCEDURE:  CT Angiography of the Chest.  Sagittal and coronal reformats were performed as well as 3D(MIP) reconstructions.    FINDINGS:    LUNGS AND AIRWAYS: Patent central airways.  Focal subpleural scarlike opacities.  PLEURA: No pleural effusion.  MEDIASTINUM AND ELIA: No lymphadenopathy.  VESSELS: Evidence of filling defects involving the lobarand segmental pulmonary arteries of the upper and lower lobes bilaterally, consistent with pulmonary emboli.  HEART: Heart size is normal. No pericardial effusion. Evidence of abnormal RV/LV ratio measuring 1.21, consistent with right ventricular strain.  CHEST WALL AND LOWER NECK: Within normal limits.  VISUALIZED UPPER ABDOMEN: Hepatic steatosis.  BONES: Within normal limits.    IMPRESSION:  Bilateral multifocal lobar and segmental pulmonary emboli with associated right ventricular strain.    < end of copied text >      LABS:	 	  CARDIAC MARKERS ( 12 May 2021 06:35 )  x     / 0.06 ng/mL / x     / x     / x      p-BNP 12 May 2021 06:35: x    , CARDIAC MARKERS ( 11 May 2021 23:18 )  x     / 0.05 ng/mL / x     / x     / x      p-BNP 11 May 2021 23:18: x    , CARDIAC MARKERS ( 11 May 2021 12:38 )  x     / 0.10 ng/mL / x     / x     / x      p-BNP 11 May 2021 12:38: 1704 pg/mL                          12.2   9.24  )-----------( 184      ( 13 May 2021 03:11 )             39.2         143  |  108<H>  |  8.0  ----------------------------<  91  3.9   |  24.0  |  0.75    Ca    8.8      13 May 2021 07:13  Phos  3.4       Mg     1.8         TPro  7.2  /  Alb  3.6  /  TBili  0.7  /  DBili  x   /  AST  40<H>  /  ALT  64<H>  /  AlkPhos  66      proBNP: Serum Pro-Brain Natriuretic Peptide: 1704 pg/mL ( @ 12:38)

## 2021-05-13 NOTE — PROGRESS NOTE ADULT - SUBJECTIVE AND OBJECTIVE BOX
[facility] Division of Hospital Medicine    Chief Complaint:  massive PE    SUBJECTIVE: reports feeling better with improved DIXON, no new complains    OVERNIGHT EVENTS: none reported     Patient denies chest pain, SOB, abd pain, N/V, fever, chills, dysuria or any other complaints. All remainder ROS negative.     MEDICATIONS  (STANDING):  amLODIPine   Tablet 5 milliGRAM(s) Oral daily  heparin  Infusion. 1800 Unit(s)/Hr (18 mL/Hr) IV Continuous <Continuous>  losartan 100 milliGRAM(s) Oral daily  warfarin 10 milliGRAM(s) Oral once    MEDICATIONS  (PRN):  ALBUTerol    0.083% 2.5 milliGRAM(s) Nebulizer every 6 hours PRN Shortness of Breath and/or Wheezing  heparin   Injectable 9000 Unit(s) IV Push every 6 hours PRN For aPTT less than 40  heparin   Injectable 4500 Unit(s) IV Push every 6 hours PRN For aPTT between 40 - 57        I&O's Summary    12 May 2021 07:01  -  13 May 2021 07:00  --------------------------------------------------------  IN: 2130 mL / OUT: 1950 mL / NET: 180 mL    13 May 2021 07:01  -  13 May 2021 17:58  --------------------------------------------------------  IN: 264 mL / OUT: 600 mL / NET: -336 mL        PHYSICAL EXAM:  Vital Signs Last 24 Hrs  T(C): 37.1 (13 May 2021 16:00), Max: 37.7 (13 May 2021 11:35)  T(F): 98.7 (13 May 2021 16:00), Max: 99.8 (13 May 2021 11:35)  HR: 106 (13 May 2021 17:00) (77 - 106)  BP: 130/81 (13 May 2021 17:00) (88/56 - 163/111)  BP(mean): 97 (13 May 2021 17:00) (65 - 124)  RR: 21 (13 May 2021 17:00) (14 - 33)  SpO2: 96% (13 May 2021 17:00) (93% - 99%)        CONSTITUTIONAL: NAD, well-developed, well-groomed, obese  ENMT: Moist oral mucosa, no pharyngeal injection or exudates; normal dentition; No JVD  RESPIRATORY: Normal respiratory effort; lungs are clear to auscultation bilaterally  CARDIOVASCULAR: Regular rate and rhythm, normal S1 and S2, no murmur/rub/gallop; No lower extremity edema; Peripheral pulses are 2+ bilaterally, groin access w/o apparent hematoma or inflamation  ABDOMEN: Nontender to palpation, normoactive bowel sounds, no rebound/guarding; No hepatosplenomegaly  MUSCLOSKELETAL:  no clubbing or cyanosis of digits; no joint swelling or tenderness to palpation  PSYCH: A+O to person, place, and time; affect appropriate  NEUROLOGY: CN 2-12 are intact and symmetric; no gross sensory deficits; was observed moving all 4 ext against gravity cooperating with exam.   SKIN: No rashes; no palpable lesions    LABS:                        14.0   10.01 )-----------( 195      ( 13 May 2021 15:34 )             46.0     05-13    143  |  108<H>  |  8.0  ----------------------------<  91  3.9   |  24.0  |  0.75    Ca    8.8      13 May 2021 07:13  Phos  3.4     05-13  Mg     1.8     05-13    TPro  7.2  /  Alb  3.6  /  TBili  0.7  /  DBili  x   /  AST  40<H>  /  ALT  64<H>  /  AlkPhos  66  05-13    PTT - ( 13 May 2021 15:34 )  PTT:51.0 sec  CARDIAC MARKERS ( 12 May 2021 06:35 )  x     / 0.06 ng/mL / x     / x     / x      CARDIAC MARKERS ( 11 May 2021 23:18 )  x     / 0.05 ng/mL / x     / x     / x              CAPILLARY BLOOD GLUCOSE            RADIOLOGY & ADDITIONAL TESTS:  Results Reviewed:   Imaging Personally Reviewed:  Electrocardiogram Personally Reviewed:

## 2021-05-13 NOTE — CONSULT NOTE ADULT - SUBJECTIVE AND OBJECTIVE BOX
46 y/o obese male with no significant PMHx , on no medicines came to the ER for exertional dyspnea for few days, pt's ct angio with Bilateral multifocal lobar and segmental pulmonary emboli with associated right ventricular strain.  Denies any CP, palpitations, LOC, fever, cough, N/V/D. no sick contact, covid-19 negative in the ER (no hx of COVID +), patient didn't received Covid vaccine, no abd. pain, no black stool or hematemesis/ hemoptysis reported. Patient denies recent travel, sedentary, hx of recent surgery or fx, malignancy, bleeding or clotting hx, hx of DVT. No recent travel to     seen in ICU, no acute events ON.    ROS - Negative other than HPI    PAST MEDICAL & SURGICAL HISTORY:  No pertinent past medical history    No significant past surgical history    FAMILY HISTORY:  No pertinent family history in first degree relatives    Social History:  no smoking, no etoh. (11 May 2021 20:47)    MEDICATIONS  (STANDING):  alteplase    Infusion 1 mG/Hr (25 mL/Hr) IntraCatheter.. <Continuous>  alteplase    Infusion 1 mG/Hr (25 mL/Hr) IntraCatheter.. <Continuous>  amLODIPine   Tablet 5 milliGRAM(s) Oral daily  heparin  Infusion 500 Unit(s)/Hr (4 mL/Hr) IV Continuous <Continuous>  heparin  Infusion 400 Unit(s)/Hr (4 mL/Hr) IV Continuous <Continuous>  losartan 100 milliGRAM(s) Oral daily  sodium chloride 0.9%. 1000 milliLiter(s) (35 mL/Hr) IV Continuous <Continuous>  sodium chloride 0.9%. 1000 milliLiter(s) (35 mL/Hr) IV Continuous <Continuous>    MEDICATIONS  (PRN):  ALBUTerol    0.083% 2.5 milliGRAM(s) Nebulizer every 6 hours PRN Shortness of Breath and/or Wheezing    ICU Vital Signs Last 24 Hrs  T(C): 36.8 (13 May 2021 07:24), Max: 37.3 (12 May 2021 23:43)  T(F): 98.3 (13 May 2021 07:24), Max: 99.2 (12 May 2021 23:43)  HR: 80 (13 May 2021 07:00) (77 - 107)  BP: 139/89 (13 May 2021 07:00) (88/56 - 163/111)  BP(mean): 104 (13 May 2021 07:00) (65 - 124)  ABP: --  ABP(mean): --  RR: 22 (13 May 2021 07:00) (13 - 25)  SpO2: 96% (13 May 2021 07:00) (93% - 97%)    GEN - alert and oriented  heart - S1S2 RRR  Lung - Dec BS b/l  Abd - soft,ND,NT  Ext - no edema                          12.2   9.24  )-----------( 184      ( 13 May 2021 03:11 )             39.2     05-13    143  |  108<H>  |  8.0  ----------------------------<  91  3.9   |  24.0  |  0.75    Ca    8.8      13 May 2021 07:13  Phos  3.4     05-13  Mg     1.8     05-13    TPro  7.2  /  Alb  3.6  /  TBili  0.7  /  DBili  x   /  AST  40<H>  /  ALT  64<H>  /  AlkPhos  66  05-13     44 y/o obese male with no significant PMHx , on no medicines came to the ER for exertional dyspnea for few days, pt's ct angio with Bilateral multifocal lobar and segmental pulmonary emboli with associated right ventricular strain.  Denies any CP, palpitations, LOC, fever, cough, N/V/D. no sick contact, covid-19 negative in the ER (no hx of COVID +), patient didn't received Covid vaccine, no abd. pain, no black stool or hematemesis/ hemoptysis reported. Patient denies recent travel, sedentary, hx of recent surgery or fx, malignancy, bleeding or clotting hx, hx of DVT. No recent travel to     seen in ICU, s/p TPA, heparin resumed, on 2l O2    ROS - Negative other than HPI    PAST MEDICAL & SURGICAL HISTORY:  No pertinent past medical history    No significant past surgical history    FAMILY HISTORY:  No pertinent family history in first degree relatives    Social History:  no smoking, no etoh. (11 May 2021 20:47)    MEDICATIONS  (STANDING):  alteplase    Infusion 1 mG/Hr (25 mL/Hr) IntraCatheter.. <Continuous>  alteplase    Infusion 1 mG/Hr (25 mL/Hr) IntraCatheter.. <Continuous>  amLODIPine   Tablet 5 milliGRAM(s) Oral daily  heparin  Infusion 500 Unit(s)/Hr (4 mL/Hr) IV Continuous <Continuous>  heparin  Infusion 400 Unit(s)/Hr (4 mL/Hr) IV Continuous <Continuous>  losartan 100 milliGRAM(s) Oral daily  sodium chloride 0.9%. 1000 milliLiter(s) (35 mL/Hr) IV Continuous <Continuous>  sodium chloride 0.9%. 1000 milliLiter(s) (35 mL/Hr) IV Continuous <Continuous>    MEDICATIONS  (PRN):  ALBUTerol    0.083% 2.5 milliGRAM(s) Nebulizer every 6 hours PRN Shortness of Breath and/or Wheezing    ICU Vital Signs Last 24 Hrs  T(C): 36.8 (13 May 2021 07:24), Max: 37.3 (12 May 2021 23:43)  T(F): 98.3 (13 May 2021 07:24), Max: 99.2 (12 May 2021 23:43)  HR: 80 (13 May 2021 07:00) (77 - 107)  BP: 139/89 (13 May 2021 07:00) (88/56 - 163/111)  BP(mean): 104 (13 May 2021 07:00) (65 - 124)  ABP: --  ABP(mean): --  RR: 22 (13 May 2021 07:00) (13 - 25)  SpO2: 96% (13 May 2021 07:00) (93% - 97%)    GEN - alert and oriented  heart - S1S2 RRR  Lung - Dec BS b/l  Abd - soft,ND,NT  Ext - no edema                          12.2   9.24  )-----------( 184      ( 13 May 2021 03:11 )             39.2     05-13    143  |  108<H>  |  8.0  ----------------------------<  91  3.9   |  24.0  |  0.75    Ca    8.8      13 May 2021 07:13  Phos  3.4     05-13  Mg     1.8     05-13    TPro  7.2  /  Alb  3.6  /  TBili  0.7  /  DBili  x   /  AST  40<H>  /  ALT  64<H>  /  AlkPhos  66  05-13

## 2021-05-14 ENCOUNTER — TRANSCRIPTION ENCOUNTER (OUTPATIENT)
Age: 46
End: 2021-05-14

## 2021-05-14 VITALS
HEART RATE: 78 BPM | RESPIRATION RATE: 18 BRPM | OXYGEN SATURATION: 96 % | SYSTOLIC BLOOD PRESSURE: 133 MMHG | DIASTOLIC BLOOD PRESSURE: 87 MMHG | TEMPERATURE: 99 F

## 2021-05-14 LAB
ALBUMIN SERPL ELPH-MCNC: 3.7 G/DL — SIGNIFICANT CHANGE UP (ref 3.3–5.2)
ALP SERPL-CCNC: 66 U/L — SIGNIFICANT CHANGE UP (ref 40–120)
ALT FLD-CCNC: 49 U/L — HIGH
ANION GAP SERPL CALC-SCNC: 10 MMOL/L — SIGNIFICANT CHANGE UP (ref 5–17)
APTT BLD: 120.1 SEC — CRITICAL HIGH (ref 27.5–35.5)
APTT BLD: 96.4 SEC — HIGH (ref 27.5–35.5)
AST SERPL-CCNC: 24 U/L — SIGNIFICANT CHANGE UP
BASOPHILS # BLD AUTO: 0.05 K/UL — SIGNIFICANT CHANGE UP (ref 0–0.2)
BASOPHILS NFR BLD AUTO: 0.6 % — SIGNIFICANT CHANGE UP (ref 0–2)
BILIRUB SERPL-MCNC: 0.5 MG/DL — SIGNIFICANT CHANGE UP (ref 0.4–2)
BUN SERPL-MCNC: 12 MG/DL — SIGNIFICANT CHANGE UP (ref 8–20)
CALCIUM SERPL-MCNC: 9.5 MG/DL — SIGNIFICANT CHANGE UP (ref 8.6–10.2)
CEA SERPL-MCNC: 3.2 NG/ML — SIGNIFICANT CHANGE UP (ref 0–3.8)
CHLORIDE SERPL-SCNC: 107 MMOL/L — SIGNIFICANT CHANGE UP (ref 98–107)
CO2 SERPL-SCNC: 26 MMOL/L — SIGNIFICANT CHANGE UP (ref 22–29)
CREAT SERPL-MCNC: 0.92 MG/DL — SIGNIFICANT CHANGE UP (ref 0.5–1.3)
DRVVT SCREEN TO CONFIRM RATIO: SIGNIFICANT CHANGE UP
EOSINOPHIL # BLD AUTO: 0.29 K/UL — SIGNIFICANT CHANGE UP (ref 0–0.5)
EOSINOPHIL NFR BLD AUTO: 3.2 % — SIGNIFICANT CHANGE UP (ref 0–6)
FERRITIN SERPL-MCNC: 37 NG/ML — SIGNIFICANT CHANGE UP (ref 30–400)
GLUCOSE SERPL-MCNC: 108 MG/DL — HIGH (ref 70–99)
HCT VFR BLD CALC: 40.8 % — SIGNIFICANT CHANGE UP (ref 39–50)
HGB BLD-MCNC: 12.5 G/DL — LOW (ref 13–17)
IMM GRANULOCYTES NFR BLD AUTO: 0.7 % — SIGNIFICANT CHANGE UP (ref 0–1.5)
INR BLD: 1.32 RATIO — HIGH (ref 0.88–1.16)
IRON SATN MFR SERPL: 10 % — LOW (ref 16–55)
IRON SATN MFR SERPL: 40 UG/DL — LOW (ref 59–158)
LA NT DPL PPP QL: 58.3 SEC — SIGNIFICANT CHANGE UP
LYMPHOCYTES # BLD AUTO: 1.47 K/UL — SIGNIFICANT CHANGE UP (ref 1–3.3)
LYMPHOCYTES # BLD AUTO: 16.2 % — SIGNIFICANT CHANGE UP (ref 13–44)
MAGNESIUM SERPL-MCNC: 2.1 MG/DL — SIGNIFICANT CHANGE UP (ref 1.6–2.6)
MCHC RBC-ENTMCNC: 21.7 PG — LOW (ref 27–34)
MCHC RBC-ENTMCNC: 30.6 GM/DL — LOW (ref 32–36)
MCV RBC AUTO: 70.7 FL — LOW (ref 80–100)
MONOCYTES # BLD AUTO: 0.77 K/UL — SIGNIFICANT CHANGE UP (ref 0–0.9)
MONOCYTES NFR BLD AUTO: 8.5 % — SIGNIFICANT CHANGE UP (ref 2–14)
NEUTROPHILS # BLD AUTO: 6.43 K/UL — SIGNIFICANT CHANGE UP (ref 1.8–7.4)
NEUTROPHILS NFR BLD AUTO: 70.8 % — SIGNIFICANT CHANGE UP (ref 43–77)
NORMALIZED SCT PPP-RTO: 0.94 RATIO — SIGNIFICANT CHANGE UP (ref 0–1.16)
NORMALIZED SCT PPP-RTO: SIGNIFICANT CHANGE UP
PHOSPHATE SERPL-MCNC: 4.2 MG/DL — SIGNIFICANT CHANGE UP (ref 2.4–4.7)
PLATELET # BLD AUTO: 197 K/UL — SIGNIFICANT CHANGE UP (ref 150–400)
POTASSIUM SERPL-MCNC: 4.2 MMOL/L — SIGNIFICANT CHANGE UP (ref 3.5–5.3)
POTASSIUM SERPL-SCNC: 4.2 MMOL/L — SIGNIFICANT CHANGE UP (ref 3.5–5.3)
PROT SERPL-MCNC: 7.3 G/DL — SIGNIFICANT CHANGE UP (ref 6.6–8.7)
PROTHROM AB SERPL-ACNC: 15.1 SEC — HIGH (ref 10.6–13.6)
PSA FLD-MCNC: 0.92 NG/ML — SIGNIFICANT CHANGE UP (ref 0–4)
RBC # BLD: 5.77 M/UL — SIGNIFICANT CHANGE UP (ref 4.2–5.8)
RBC # FLD: 18.4 % — HIGH (ref 10.3–14.5)
SODIUM SERPL-SCNC: 143 MMOL/L — SIGNIFICANT CHANGE UP (ref 135–145)
TIBC SERPL-MCNC: 402 UG/DL — SIGNIFICANT CHANGE UP (ref 220–430)
TRANSFERRIN SERPL-MCNC: 281 MG/DL — SIGNIFICANT CHANGE UP (ref 180–329)
WBC # BLD: 9.07 K/UL — SIGNIFICANT CHANGE UP (ref 3.8–10.5)
WBC # FLD AUTO: 9.07 K/UL — SIGNIFICANT CHANGE UP (ref 3.8–10.5)

## 2021-05-14 PROCEDURE — 99232 SBSQ HOSP IP/OBS MODERATE 35: CPT

## 2021-05-14 PROCEDURE — 99239 HOSP IP/OBS DSCHRG MGMT >30: CPT

## 2021-05-14 RX ORDER — FERROUS SULFATE 325(65) MG
1 TABLET ORAL
Qty: 30 | Refills: 0
Start: 2021-05-14 | End: 2021-06-12

## 2021-05-14 RX ORDER — AMLODIPINE BESYLATE 2.5 MG/1
1 TABLET ORAL
Qty: 30 | Refills: 0
Start: 2021-05-14 | End: 2021-06-12

## 2021-05-14 RX ORDER — FERROUS SULFATE 325(65) MG
325 TABLET ORAL DAILY
Refills: 0 | Status: DISCONTINUED | OUTPATIENT
Start: 2021-05-14 | End: 2021-05-14

## 2021-05-14 RX ORDER — APIXABAN 2.5 MG/1
1 TABLET, FILM COATED ORAL
Qty: 74 | Refills: 0
Start: 2021-05-14 | End: 2021-06-12

## 2021-05-14 RX ORDER — LOSARTAN POTASSIUM 100 MG/1
1 TABLET, FILM COATED ORAL
Qty: 30 | Refills: 0
Start: 2021-05-14 | End: 2021-06-12

## 2021-05-14 RX ORDER — ALBUTEROL 90 UG/1
2 AEROSOL, METERED ORAL
Qty: 1 | Refills: 0
Start: 2021-05-14 | End: 2021-05-28

## 2021-05-14 RX ORDER — APIXABAN 2.5 MG/1
10 TABLET, FILM COATED ORAL ONCE
Refills: 0 | Status: COMPLETED | OUTPATIENT
Start: 2021-05-14 | End: 2021-05-14

## 2021-05-14 RX ADMIN — APIXABAN 10 MILLIGRAM(S): 2.5 TABLET, FILM COATED ORAL at 12:15

## 2021-05-14 RX ADMIN — AMLODIPINE BESYLATE 5 MILLIGRAM(S): 2.5 TABLET ORAL at 05:52

## 2021-05-14 RX ADMIN — HEPARIN SODIUM 1800 UNIT(S)/HR: 5000 INJECTION INTRAVENOUS; SUBCUTANEOUS at 05:50

## 2021-05-14 RX ADMIN — Medication 325 MILLIGRAM(S): at 12:15

## 2021-05-14 RX ADMIN — LOSARTAN POTASSIUM 100 MILLIGRAM(S): 100 TABLET, FILM COATED ORAL at 05:52

## 2021-05-14 NOTE — PROGRESS NOTE ADULT - SUBJECTIVE AND OBJECTIVE BOX
[facility]                                                               Office: [address] [address]                                                              Telephone: [phone]. Fax:[phone]                                                                             PROGRESS NOTE  Reason for follow up:   Overnight: No new events.   Update:     Subjective: "  ______________________"      Review of symptoms:   Cardiac:  No chest pain. No dyspnea. No palpitations.  Respiratory:no cough. No dyspnea  Gastrointestinal: No diarrhea. No abdominal pain. No bleeding.   Neuro: No focal neuro complaints.      Vitals:  T(C): 37 (05-14-21 @ 04:36), Max: 37.7 (05-13-21 @ 11:35)  HR: 83 (05-14-21 @ 04:36) (81 - 106)  BP: 133/85 (05-14-21 @ 04:36) (114/92 - 153/104)  RR: 20 (05-14-21 @ 04:36) (15 - 33)  SpO2: 97% (05-14-21 @ 04:36) (95% - 99%)  Wt(kg): --  I&O's Summary    13 May 2021 07:01  -  14 May 2021 07:00  --------------------------------------------------------  IN: 722 mL / OUT: 1800 mL / NET: -1078 mL      Weight (kg): 110.7 (05-11 @ 14:18)      PHYSICAL EXAM:  Appearance: Comfortable. No acute distress  HEENT:  Atraumatic. Normocephalic.  Normal oral mucosa, PERRL, Neck is supple. No carotid bruit.   Neurologic: A & O x 3, no focal deficits. EOMI.  Cardiovascular: Normal S1 S2, No murmur, rubs/gallops. No JVD, No edema  Respiratory: Lungs clear to auscultation, unlabored   Gastrointestinal:  Soft, Non-tender, + BS  Lower Extremities: No edema  Psychiatry: Patient is calm. No agitation. Mood & affect appropriate  Skin: No rashes/ ecchymoses/cyanosis/ulcers visualized on the face, hands or feet.      CURRENT MEDICATIONS:  amLODIPine   Tablet 5 milliGRAM(s) Oral daily  losartan 100 milliGRAM(s) Oral daily    heparin  Infusion.      DIAGNOSTIC TESTING:  [ ] Echocardiogram:   [ ]  Catheterization:  [ ] Stress Test:    OTHER: 	      LABS:	 	  CARDIAC MARKERS ( 12 May 2021 06:35 )  x     / 0.06 ng/mL / x     / x     / x      p-BNP 12 May 2021 06:35: x    , CARDIAC MARKERS ( 11 May 2021 23:18 )  x     / 0.05 ng/mL / x     / x     / x      p-BNP 11 May 2021 23:18: x    , CARDIAC MARKERS ( 11 May 2021 12:38 )  x     / 0.10 ng/mL / x     / x     / x      p-BNP 11 May 2021 12:38: 1704 pg/mL                          12.5   9.07  )-----------( 197      ( 14 May 2021 05:02 )             40.8     05-14    143  |  107  |  12.0  ----------------------------<  108<H>  4.2   |  26.0  |  0.92    Ca    9.5      14 May 2021 05:02  Phos  4.2     05-14  Mg     2.1     05-14    TPro  7.3  /  Alb  3.7  /  TBili  0.5  /  DBili  x   /  AST  24  /  ALT  49<H>  /  AlkPhos  66  05-14    proBNP: Serum Pro-Brain Natriuretic Peptide: 1704 pg/mL (05-11 @ 12:38)    Lipid Profile:   HgA1c:   TSH:       TELEMETRY: Reviewed    ECG:  Reviewed by me. 	                                                                      [facility]                                                               Office: [address] [address]                                                              Telephone: [phone]. Fax:[phone]                                                                             PROGRESS NOTE  Reason for follow up: PE s/p EKOS  Update: Groin sites clean, dry intact. VSS. Eliquis approved. Pt for discharge today       Review of symptoms:   Cardiac:  No chest pain. No dyspnea. No palpitations.  Respiratory: no cough. No dyspnea  Gastrointestinal: No diarrhea. No abdominal pain. No bleeding.   Neuro: No focal neuro complaints.      Vitals:  T(C): 37 (05-14-21 @ 04:36), Max: 37.7 (05-13-21 @ 11:35)  HR: 83 (05-14-21 @ 04:36) (81 - 106)  BP: 133/85 (05-14-21 @ 04:36) (114/92 - 153/104)  RR: 20 (05-14-21 @ 04:36) (15 - 33)  SpO2: 97% (05-14-21 @ 04:36) (95% - 99%)      I&O's Summary    13 May 2021 07:01  -  14 May 2021 07:00  --------------------------------------------------------  IN: 722 mL / OUT: 1800 mL / NET: -1078 mL      Weight (kg): 110.7 (05-11 @ 14:18)      PHYSICAL EXAM:  Appearance: Comfortable. No acute distress  HEENT:  Atraumatic. Normocephalic.  Normal oral mucosa  Neurologic: A & O x 3, no focal deficits. EOMI.  Cardiovascular: Normal S1 S2, No murmur, rubs/gallops. No JVD, No edema  Respiratory: Lungs clear to auscultation, unlabored   Gastrointestinal:  Soft, Non-tender, + BS  Lower Extremities: No edema  Psychiatry: Patient is calm. No agitation. Mood & affect appropriate  Skin: No rashes/ ecchymoses/cyanosis/ulcers visualized on the face, hands or feet.      CURRENT MEDICATIONS:  amLODIPine   Tablet 5 milliGRAM(s) Oral daily  losartan 100 milliGRAM(s) Oral daily    heparin  Infusion.      DIAGNOSTIC TESTING:  [ ] Echocardiogram:   < from: TTE Echo Complete w/o Contrast w/ Doppler (05.13.21 @ 09:26) >      Summary:   1. Limited follow up study to reassess RV function s/p EKOS.   2. Left ventricular ejection fraction, by visual estimation, is 60 to 65%.   3. Normal global left ventricular systolic function.   4. Moderately enlarged right ventricle with moderately reduced systolic function, inadequate estimation of RVSP.   5. There is a significant pericardial fat pad present.   6. Trivial pericardial effusion at the RV apex.   7. Compared to the prior TTE study from 5/11/21, slight improvement in RV size and function.    Delonte Moise DO Electronically signed on 5/13/2021 at 12:39:05 PM    < end of copied text >      LABS:	 	  CARDIAC MARKERS ( 12 May 2021 06:35 )  x     / 0.06 ng/mL / x     / x     / x      p-BNP 12 May 2021 06:35: x    , CARDIAC MARKERS ( 11 May 2021 23:18 )  x     / 0.05 ng/mL / x     / x     / x      p-BNP 11 May 2021 23:18: x    , CARDIAC MARKERS ( 11 May 2021 12:38 )  x     / 0.10 ng/mL / x     / x     / x      p-BNP 11 May 2021 12:38: 1704 pg/mL                          12.5   9.07  )-----------( 197      ( 14 May 2021 05:02 )             40.8     05-14    143  |  107  |  12.0  ----------------------------<  108<H>  4.2   |  26.0  |  0.92    Ca    9.5      14 May 2021 05:02  Phos  4.2     05-14  Mg     2.1     05-14    TPro  7.3  /  Alb  3.7  /  TBili  0.5  /  DBili  x   /  AST  24  /  ALT  49<H>  /  AlkPhos  66  05-14    proBNP: Serum Pro-Brain Natriuretic Peptide: 1704 pg/mL (05-11 @ 12:38)      TELEMETRY: SR

## 2021-05-14 NOTE — CHART NOTE - NSCHARTNOTEFT_GEN_A_CORE
To whom it may concern,    The above named patient was admitted to [facility] on May 11th 2021 and Discharged May 14th 2021. Patient may return to work with light duty until cleared by Primary Care Provider.     Thank You for your attention in this matter.     Ubaldo Goodman MD   [facility] License [id number]  Division of Hospital Medicine   [facility]

## 2021-05-14 NOTE — DISCHARGE NOTE NURSING/CASE MANAGEMENT/SOCIAL WORK - PATIENT PORTAL LINK FT
You can access the FollowMyHealth Patient Portal offered by [facility] by registering at the following website: [email or web address]. By joining [facility]’s FollowMyHealth portal, you will also be able to view your health information using other applications (apps) compatible with our system.

## 2021-05-14 NOTE — PROGRESS NOTE ADULT - ASSESSMENT
46 y/o obese male with no PMHx  came to the ER for exertional dyspnea since Monday AM. found to have PE. s/p EKOS    Pulmonary Embolism   s/p EKOS  Eliquis 10mg PO BID x 7 days, 5mg PO after  follow up with dr ramos in two weeks    HTN  cont amlodipine, losartan

## 2021-05-14 NOTE — DISCHARGE NOTE PROVIDER - PROVIDER TOKENS
PROVIDER:[TOKEN:[[id number]]],PROVIDER:[TOKEN:[[id number]]],PROVIDER:[TOKEN:[[id number]]],FREE:[LAST:[Primary Care Doctor],PHONE:[(   )    -],FAX:[(   )    -]]

## 2021-05-14 NOTE — PROGRESS NOTE ADULT - ATTENDING COMMENTS
S/P EKOS  Pt will need AC with warfarin, can not afford DOACs  Repeat Echo for RV size and function  Hematology evaluation in progress.
pt is stable, on room air.  RV is still enlarged and hypodynamic  pt to fu with me in 2 weeks  AC with eliquis as per protocol  I agree with a/p.   Pleasecall if needed.

## 2021-05-14 NOTE — PROGRESS NOTE ADULT - PROVIDER SPECIALTY LIST ADULT
Cardiology
Hospitalist
Internal Medicine
Cardiology
Hospitalist
MICU
Critical Care

## 2021-05-14 NOTE — DISCHARGE NOTE PROVIDER - CARE PROVIDER_API CALL
Елена Campbell)  HematologyOncology; Internal Medicine; Medical Oncology  [address]  [place] [address]  Phone: [phone]  Fax: [phone]  Follow Up Time:     Moshe Oliva)  Cardiovascular Disease  [address], [address]  [place] [address]  Phone: [phone]  Fax: [phone]  Follow Up Time:     Shantanu Reyes)  Gastroenterology; Internal Medicine  [address], [address]  [place] [address]  Phone: [phone]  Fax: [phone]  Follow Up Time:     Primary Care Doctor,   Phone: (   )    -  Fax: (   )    -  Follow Up Time:

## 2021-05-14 NOTE — DISCHARGE NOTE PROVIDER - NSDCMRMEDTOKEN_GEN_ALL_CORE_FT
albuterol 90 mcg/inh inhalation aerosol: 2 puff(s) inhaled every 6 hours, As Needed shortness of breath  amLODIPine 5 mg oral tablet: 1 tab(s) orally once a day  Eliquis Starter Pack for Treatment of DVT and PE 5 mg oral tablet: Take as directed per box 2 tablets every 12 hours x 7 days then 1 tablet every 12 hours after  ferrous sulfate 325 mg (65 mg elemental iron) oral tablet: 1 tab(s) orally once a day  losartan 100 mg oral tablet: 1 tab(s) orally once a day

## 2021-05-14 NOTE — DISCHARGE NOTE PROVIDER - NSDCCPCAREPLAN_GEN_ALL_CORE_FT
PRINCIPAL DISCHARGE DIAGNOSIS  Diagnosis: Acute pulmonary embolism, unspecified pulmonary embolism type, unspecified whether acute cor pulmonale present  Assessment and Plan of Treatment: - take medications as rx   - follow up with heme/onc and gastroenterology (will need colonoscopy and egd in future)  - follow up with cardiology and pmd

## 2021-05-14 NOTE — PROGRESS NOTE ADULT - SUBJECTIVE AND OBJECTIVE BOX
Patient is a 45y old  Male who presents with a chief complaint of Pulmonary embolism (14 May 2021 11:03)    Patient seen and examined at bedside.    ALLERGIES:  No Known Allergies    MEDICATIONS  (STANDING):  amLODIPine   Tablet 5 milliGRAM(s) Oral daily  ferrous    sulfate 325 milliGRAM(s) Oral daily  heparin  Infusion. 1800 Unit(s)/Hr (18 mL/Hr) IV Continuous <Continuous>  losartan 100 milliGRAM(s) Oral daily    MEDICATIONS  (PRN):  ALBUTerol    0.083% 2.5 milliGRAM(s) Nebulizer every 6 hours PRN Shortness of Breath and/or Wheezing  heparin   Injectable 9000 Unit(s) IV Push every 6 hours PRN For aPTT less than 40  heparin   Injectable 4500 Unit(s) IV Push every 6 hours PRN For aPTT between 40 - 57    Vital Signs Last 24 Hrs  T(F): 98.6 (14 May 2021 04:36), Max: 99.8 (13 May 2021 11:35)  HR: 83 (14 May 2021 04:36) (81 - 106)  BP: 133/85 (14 May 2021 04:36) (114/92 - 153/104)  RR: 20 (14 May 2021 04:36) (15 - 33)  SpO2: 97% (14 May 2021 04:36) (95% - 99%)  I&O's Summary    13 May 2021 07:01  -  14 May 2021 07:00  --------------------------------------------------------  IN: 722 mL / OUT: 1800 mL / NET: -1078 mL      PHYSICAL EXAM:  General: NAD, A/O x 3  ENT: MMM, no thrush  Neck: Supple, No JVD  Lungs: good air entry, non-labored breathing  Cardio: +s1/s2  Abdomen: Soft, Nontender, Nondistended; Bowel sounds present  Extremities: No calf tenderness, No pitting edema    LABS:                        12.5   9.07  )-----------( 197      ( 14 May 2021 05:02 )             40.8     05-14    143  |  107  |  12.0  ----------------------------<  108  4.2   |  26.0  |  0.92    Ca    9.5      14 May 2021 05:02  Phos  4.2     05-14  Mg     2.1     05-14    TPro  7.3  /  Alb  3.7  /  TBili  0.5  /  DBili  x   /  AST  24  /  ALT  49  /  AlkPhos  66  05-14    eGFR if Non African American: 100 mL/min/1.73M2 (05-14-21 @ 05:02)  eGFR if African American: 116 mL/min/1.73M2 (05-14-21 @ 05:02)    PT/INR - ( 14 May 2021 05:02 )   PT: 15.1 sec;   INR: 1.32 ratio    PTT - ( 14 May 2021 05:02 )  PTT:120.1 sec    Procalcitonin, Serum: 0.09 ng/mL (05-11-21 @ 12:38)    CARDIAC MARKERS ( 12 May 2021 06:35 )  x     / 0.06 ng/mL / x     / x     / x      CARDIAC MARKERS ( 11 May 2021 23:18 )  x     / 0.05 ng/mL / x     / x     / x      CARDIAC MARKERS ( 11 May 2021 12:38 )  x     / 0.10 ng/mL / x     / x     / x        RADIOLOGY & ADDITIONAL TESTS:  < from: CT Angio Chest w/ IV Cont (05.11.21 @ 17:45) >  IMPRESSION:  Bilateral multifocal lobar and segmental pulmonary emboli with associated right ventricular strain.  < end of copied text >    < from: US Duplex Venous Lower Ext Ltd, Right (05.11.21 @ 16:00) >  IMPRESSION:  No evidence of right lower extremity deep venous thrombosis.  < end of copied text >    < from: Xray Chest 1 View-PORTABLE IMMEDIATE (Xray Chest 1 View-PORTABLE IMMEDIATE .) (05.11.21 @ 15:08) >  IMPRESSION: Clear lungs at this time. Probable heart enlargement and prominent superior mediastinum are unchanged.  < end of copied text >    Care Discussed with Consultants/Other Providers:   Cardiology

## 2021-05-14 NOTE — DISCHARGE NOTE PROVIDER - CARE PROVIDERS DIRECT ADDRESSES
,DirectAddress_Unknown,[email or web address],DirectAddress_Unknown

## 2021-05-23 DIAGNOSIS — Z86.16 PERSONAL HISTORY OF COVID-19: ICD-10-CM

## 2021-05-23 DIAGNOSIS — I26.94 MULTIPLE SUBSEGMENTAL PULMONARY EMBOLI WITHOUT ACUTE COR PULMONALE: ICD-10-CM

## 2021-05-23 DIAGNOSIS — D50.9 IRON DEFICIENCY ANEMIA, UNSPECIFIED: ICD-10-CM

## 2021-05-23 DIAGNOSIS — R76.0 RAISED ANTIBODY TITER: ICD-10-CM

## 2021-05-23 DIAGNOSIS — R03.0 ELEVATED BLOOD-PRESSURE READING, WITHOUT DIAGNOSIS OF HYPERTENSION: ICD-10-CM

## 2021-05-23 DIAGNOSIS — E66.01 MORBID (SEVERE) OBESITY DUE TO EXCESS CALORIES: ICD-10-CM

## 2021-05-23 DIAGNOSIS — R74.01 ELEVATION OF LEVELS OF LIVER TRANSAMINASE LEVELS: ICD-10-CM

## 2021-05-23 DIAGNOSIS — E87.6 HYPOKALEMIA: ICD-10-CM

## 2021-06-22 PROCEDURE — 75743 ARTERY X-RAYS LUNGS: CPT | Mod: 59

## 2021-06-22 PROCEDURE — 84100 ASSAY OF PHOSPHORUS: CPT

## 2021-06-22 PROCEDURE — 80053 COMPREHEN METABOLIC PANEL: CPT

## 2021-06-22 PROCEDURE — 85379 FIBRIN DEGRADATION QUANT: CPT

## 2021-06-22 PROCEDURE — 71045 X-RAY EXAM CHEST 1 VIEW: CPT

## 2021-06-22 PROCEDURE — U0005: CPT

## 2021-06-22 PROCEDURE — 36014 PLACE CATHETER IN ARTERY: CPT

## 2021-06-22 PROCEDURE — 85384 FIBRINOGEN ACTIVITY: CPT

## 2021-06-22 PROCEDURE — 94640 AIRWAY INHALATION TREATMENT: CPT

## 2021-06-22 PROCEDURE — U0003: CPT

## 2021-06-22 PROCEDURE — 82378 CARCINOEMBRYONIC ANTIGEN: CPT

## 2021-06-22 PROCEDURE — 93005 ELECTROCARDIOGRAM TRACING: CPT

## 2021-06-22 PROCEDURE — 36415 COLL VENOUS BLD VENIPUNCTURE: CPT

## 2021-06-22 PROCEDURE — C1769: CPT

## 2021-06-22 PROCEDURE — 83880 ASSAY OF NATRIURETIC PEPTIDE: CPT

## 2021-06-22 PROCEDURE — 37211 THROMBOLYTIC ART THERAPY: CPT | Mod: 50

## 2021-06-22 PROCEDURE — 82728 ASSAY OF FERRITIN: CPT

## 2021-06-22 PROCEDURE — 80074 ACUTE HEPATITIS PANEL: CPT

## 2021-06-22 PROCEDURE — 99285 EMERGENCY DEPT VISIT HI MDM: CPT

## 2021-06-22 PROCEDURE — 85025 COMPLETE CBC W/AUTO DIFF WBC: CPT

## 2021-06-22 PROCEDURE — 99152 MOD SED SAME PHYS/QHP 5/>YRS: CPT

## 2021-06-22 PROCEDURE — 71275 CT ANGIOGRAPHY CHEST: CPT

## 2021-06-22 PROCEDURE — 85610 PROTHROMBIN TIME: CPT

## 2021-06-22 PROCEDURE — 93306 TTE W/DOPPLER COMPLETE: CPT

## 2021-06-22 PROCEDURE — 83735 ASSAY OF MAGNESIUM: CPT

## 2021-06-22 PROCEDURE — C1887: CPT

## 2021-06-22 PROCEDURE — 84466 ASSAY OF TRANSFERRIN: CPT

## 2021-06-22 PROCEDURE — 93971 EXTREMITY STUDY: CPT

## 2021-06-22 PROCEDURE — 99153 MOD SED SAME PHYS/QHP EA: CPT

## 2021-06-22 PROCEDURE — G0103: CPT

## 2021-06-22 PROCEDURE — 86769 SARS-COV-2 COVID-19 ANTIBODY: CPT

## 2021-06-22 PROCEDURE — 83550 IRON BINDING TEST: CPT

## 2021-06-22 PROCEDURE — 85730 THROMBOPLASTIN TIME PARTIAL: CPT

## 2021-06-22 PROCEDURE — 86140 C-REACTIVE PROTEIN: CPT

## 2021-06-22 PROCEDURE — 85027 COMPLETE CBC AUTOMATED: CPT

## 2021-06-22 PROCEDURE — C1894: CPT

## 2021-06-22 PROCEDURE — 83540 ASSAY OF IRON: CPT

## 2021-06-22 PROCEDURE — 84484 ASSAY OF TROPONIN QUANT: CPT

## 2021-06-22 PROCEDURE — C1724: CPT

## 2021-06-22 PROCEDURE — 84145 PROCALCITONIN (PCT): CPT

## 2021-07-07 ENCOUNTER — OUTPATIENT (OUTPATIENT)
Dept: OUTPATIENT SERVICES | Facility: HOSPITAL | Age: 46
LOS: 1 days | Discharge: HOME | End: 2021-07-07

## 2021-07-07 DIAGNOSIS — I26.99 OTHER PULMONARY EMBOLISM WITHOUT ACUTE COR PULMONALE: ICD-10-CM

## 2021-07-08 ENCOUNTER — OUTPATIENT (OUTPATIENT)
Dept: OUTPATIENT SERVICES | Facility: HOSPITAL | Age: 46
LOS: 1 days | Discharge: HOME | End: 2021-07-08

## 2021-07-08 DIAGNOSIS — I26.99 OTHER PULMONARY EMBOLISM WITHOUT ACUTE COR PULMONALE: ICD-10-CM

## 2021-07-09 ENCOUNTER — RESULT REVIEW (OUTPATIENT)
Age: 46
End: 2021-07-09

## 2021-07-09 ENCOUNTER — OUTPATIENT (OUTPATIENT)
Dept: OUTPATIENT SERVICES | Facility: HOSPITAL | Age: 46
LOS: 1 days | End: 2021-07-09

## 2021-07-09 ENCOUNTER — APPOINTMENT (OUTPATIENT)
Dept: HEMATOLOGY ONCOLOGY | Facility: CLINIC | Age: 46
End: 2021-07-09
Payer: MEDICAID

## 2021-07-09 VITALS
HEART RATE: 82 BPM | OXYGEN SATURATION: 100 % | HEIGHT: 72 IN | TEMPERATURE: 97.8 F | BODY MASS INDEX: 33.05 KG/M2 | DIASTOLIC BLOOD PRESSURE: 100 MMHG | SYSTOLIC BLOOD PRESSURE: 156 MMHG | WEIGHT: 244 LBS

## 2021-07-09 DIAGNOSIS — I26.99 OTHER PULMONARY EMBOLISM WITHOUT ACUTE COR PULMONALE: ICD-10-CM

## 2021-07-09 DIAGNOSIS — I26.99 OTHER PULMONARY EMBOLISM W/OUT ACUTE COR PULMONALE: ICD-10-CM

## 2021-07-09 DIAGNOSIS — D64.9 ANEMIA, UNSPECIFIED: ICD-10-CM

## 2021-07-09 LAB
BASOPHILS # BLD AUTO: 0.1 K/UL — SIGNIFICANT CHANGE UP (ref 0–0.2)
BASOPHILS NFR BLD AUTO: 0.9 % — SIGNIFICANT CHANGE UP (ref 0–2)
EOSINOPHIL # BLD AUTO: 0.1 K/UL — SIGNIFICANT CHANGE UP (ref 0–0.5)
EOSINOPHIL NFR BLD AUTO: 1.3 % — SIGNIFICANT CHANGE UP (ref 0–6)
HCT VFR BLD CALC: 47.1 % — HIGH (ref 34.5–45)
HGB BLD-MCNC: 14.2 G/DL — SIGNIFICANT CHANGE UP (ref 11.5–15.5)
LYMPHOCYTES # BLD AUTO: 1.5 K/UL — SIGNIFICANT CHANGE UP (ref 1–3.3)
LYMPHOCYTES # BLD AUTO: 15.8 % — SIGNIFICANT CHANGE UP (ref 13–44)
MCHC RBC-ENTMCNC: 23.3 PG — LOW (ref 27–34)
MCHC RBC-ENTMCNC: 30.1 G/DL — LOW (ref 32–36)
MCV RBC AUTO: 77.5 FL — LOW (ref 80–100)
MONOCYTES # BLD AUTO: 0.6 K/UL — SIGNIFICANT CHANGE UP (ref 0–0.9)
MONOCYTES NFR BLD AUTO: 6.1 % — SIGNIFICANT CHANGE UP (ref 2–14)
NEUTROPHILS # BLD AUTO: 7.1 K/UL — SIGNIFICANT CHANGE UP (ref 1.8–7.4)
NEUTROPHILS NFR BLD AUTO: 75.9 % — SIGNIFICANT CHANGE UP (ref 43–77)
PLATELET # BLD AUTO: 301 K/UL — SIGNIFICANT CHANGE UP (ref 150–400)
RBC # BLD: 6.08 M/UL — HIGH (ref 3.8–5.2)
RBC # FLD: 16.6 % — HIGH (ref 10.3–14.5)
WBC # BLD: 9.3 K/UL — SIGNIFICANT CHANGE UP (ref 3.8–10.5)
WBC # FLD AUTO: 9.3 K/UL — SIGNIFICANT CHANGE UP (ref 3.8–10.5)

## 2021-07-09 PROCEDURE — 99215 OFFICE O/P EST HI 40 MIN: CPT

## 2021-07-15 LAB
ALBUMIN SERPL ELPH-MCNC: 4.6 G/DL
ALP BLD-CCNC: 83 U/L
ALT SERPL-CCNC: 28 U/L
ANION GAP SERPL CALC-SCNC: 17 MMOL/L
AST SERPL-CCNC: 24 U/L
AT III PPP CHRO-ACNC: 124 %
B2 GLYCOPROT1 IGA SERPL IA-ACNC: <5 SAU
B2 GLYCOPROT1 IGG SER-ACNC: <5 SGU
B2 GLYCOPROT1 IGM SER-ACNC: <5 SMU
BILIRUB SERPL-MCNC: 0.2 MG/DL
BUN SERPL-MCNC: 10 MG/DL
CALCIUM SERPL-MCNC: 9.7 MG/DL
CARDIOLIPIN AB SER IA-ACNC: POSITIVE
CARDIOLIPIN IGM SER-MCNC: <5 GPL
CHLORIDE SERPL-SCNC: 102 MMOL/L
CO2 SERPL-SCNC: 23 MMOL/L
CREAT SERPL-MCNC: 0.98 MG/DL
DNA PLOIDY SPEC FC-IMP: NORMAL
FERRITIN SERPL-MCNC: 19 NG/ML
FOLATE SERPL-MCNC: 9.5 NG/ML
GLUCOSE SERPL-MCNC: 94 MG/DL
IRON SATN MFR SERPL: 7 %
IRON SERPL-MCNC: 28 UG/DL
POTASSIUM SERPL-SCNC: 4.1 MMOL/L
PROT C PPP CHRO-ACNC: 145 %
PROT S AG ACT/NOR PPP IA: 97 %
PROT S FREE PPP-ACNC: 73 %
PROT SERPL-MCNC: 7.6 G/DL
PTR INTERP: NORMAL
SODIUM SERPL-SCNC: 142 MMOL/L
TIBC SERPL-MCNC: 432 UG/DL
UIBC SERPL-MCNC: 404 UG/DL
VIT B12 SERPL-MCNC: 683 PG/ML

## 2021-07-21 ENCOUNTER — APPOINTMENT (OUTPATIENT)
Dept: CARDIOLOGY | Facility: CLINIC | Age: 46
End: 2021-07-21

## 2021-07-21 DIAGNOSIS — Z92.82 STATUS POST ADMINISTRATION OF TPA (RTPA) IN A DIFFERENT FACILITY WITHIN THE LAST 24 HRS PRIOR TO ADMISSION TO CURRENT FACILITY: ICD-10-CM

## 2021-07-21 DIAGNOSIS — I10 ESSENTIAL (PRIMARY) HYPERTENSION: ICD-10-CM

## 2021-07-21 DIAGNOSIS — Z87.09 PERSONAL HISTORY OF OTHER DISEASES OF THE RESPIRATORY SYSTEM: ICD-10-CM

## 2021-07-21 DIAGNOSIS — Z86.711 PERSONAL HISTORY OF PULMONARY EMBOLISM: ICD-10-CM

## 2021-07-21 DIAGNOSIS — M54.10 RADICULOPATHY, SITE UNSPECIFIED: ICD-10-CM

## 2021-07-21 RX ORDER — AMLODIPINE BESYLATE 5 MG/1
5 TABLET ORAL DAILY
Qty: 90 | Refills: 3 | Status: ACTIVE | COMMUNITY

## 2021-07-21 RX ORDER — LOSARTAN POTASSIUM 100 MG/1
100 TABLET, FILM COATED ORAL DAILY
Qty: 90 | Refills: 1 | Status: ACTIVE | COMMUNITY

## 2021-07-21 RX ORDER — CHLORHEXIDINE GLUCONATE 4 %
325 (65 FE) LIQUID (ML) TOPICAL DAILY
Qty: 90 | Refills: 0 | Status: ACTIVE | COMMUNITY

## 2021-07-21 RX ORDER — APIXABAN 5 MG/1
5 TABLET, FILM COATED ORAL
Qty: 90 | Refills: 3 | Status: ACTIVE | COMMUNITY

## 2021-07-21 RX ORDER — ALBUTEROL 90 MCG
90 AEROSOL (GRAM) INHALATION
Refills: 0 | Status: ACTIVE | COMMUNITY

## 2021-07-27 ENCOUNTER — NON-APPOINTMENT (OUTPATIENT)
Age: 46
End: 2021-07-27

## 2021-09-27 NOTE — ED ADULT TRIAGE NOTE - TEMPERATURE IN CELSIUS (DEGREES C)
[FreeTextEntry3] : I, Marilu Hill, solely acted as a scribe for Dr. America Shoemaker on 09/22/2021 . All medical entries made by the Scribe were at my, Dr. Shoemaker's, direction and personally dictated by me on 09/22/2021. I have reviewed the chart and agree that the record accurately reflects my personal performance of the history, physical exam, assessment and plan. I have also personally directed, reviewed and agreed with the chart.  37.2

## 2021-12-15 ENCOUNTER — APPOINTMENT (OUTPATIENT)
Dept: CARDIOLOGY | Facility: CLINIC | Age: 46
End: 2021-12-15

## 2021-12-15 DIAGNOSIS — R06.02 SHORTNESS OF BREATH: ICD-10-CM

## 2022-02-11 ENCOUNTER — APPOINTMENT (OUTPATIENT)
Dept: CARDIOLOGY | Facility: CLINIC | Age: 47
End: 2022-02-11

## 2022-04-12 NOTE — HISTORY OF PRESENT ILLNESS
[de-identified] : Mr. Fletcher is a 44 yo HM referred for hypercoagulable work up after recent unprovoked PE. On May 11 th 2021, patient presented to [facility] with 2 days of SOB. Work up revealed Bilateral PE and he was treated with TPA. No DVT, No provoking incidents ( no prolonged travel or bed rest, or Covid ). No prior history of DVT or PE, He was discharged on Eliquis.No Family history of DVT or PE. He later received the Yahir and Yahir Covid vaccine on June 10th.While in the hospital, he was found to be anemic, work up showed a Ferritin of 13, and he was started on  oral iron.\par \par Information obtained with assistance from  Serafin ID [id number] [de-identified] : He is feeling fine, no further SOB, no Chest pain or cough, no leg swelling or calf pain.Does have intermittent rectal bleeding, but this predated the PE and anticoagulation, and he is on oral iron .

## 2022-04-12 NOTE — ASSESSMENT
[FreeTextEntry1] : Mr. Fletcher is a 44 yo HM who presented to [facility] with SOB and was found to have bilateral unprovoked PE. Treated with TPA and discharged on Eliquis. While in the hospital was also found to be anemic, with a Ferritin of 13, and was started on oral iron. . Dr. Bangura in to see patient reviewed history and discussed with patient the need to do further hypercoagulable work up. He will need to be on Anticoagulation for a minimum of 6 months and if any work up is positive, may be longer , or even lifelong. We are also sending off anemia panel today. I explained that , while on Blood thinners, he may bleed and to let us know if rectal bleeding gets worse. He will need a GI work up at some point in the future.

## 2024-03-16 ENCOUNTER — RESULT REVIEW (OUTPATIENT)
Age: 49
End: 2024-03-16

## 2024-03-16 ENCOUNTER — INPATIENT (INPATIENT)
Facility: HOSPITAL | Age: 49
LOS: 2 days | Discharge: HOME | DRG: 175 | End: 2024-03-19
Attending: STUDENT IN AN ORGANIZED HEALTH CARE EDUCATION/TRAINING PROGRAM | Admitting: HOSPITALIST
Payer: COMMERCIAL

## 2024-03-16 VITALS
RESPIRATION RATE: 17 BRPM | DIASTOLIC BLOOD PRESSURE: 67 MMHG | HEART RATE: 113 BPM | WEIGHT: 233.47 LBS | SYSTOLIC BLOOD PRESSURE: 92 MMHG | TEMPERATURE: 98 F | OXYGEN SATURATION: 97 %

## 2024-03-16 DIAGNOSIS — I26.99 OTHER PULMONARY EMBOLISM WITHOUT ACUTE COR PULMONALE: ICD-10-CM

## 2024-03-16 DIAGNOSIS — R09.89 OTHER SPECIFIED SYMPTOMS AND SIGNS INVOLVING THE CIRCULATORY AND RESPIRATORY SYSTEMS: ICD-10-CM

## 2024-03-16 LAB
ALBUMIN SERPL ELPH-MCNC: 3.6 G/DL — SIGNIFICANT CHANGE UP (ref 3.3–5.2)
ALBUMIN SERPL ELPH-MCNC: 3.6 G/DL — SIGNIFICANT CHANGE UP (ref 3.3–5.2)
ALP SERPL-CCNC: 73 U/L — SIGNIFICANT CHANGE UP (ref 40–120)
ALP SERPL-CCNC: 74 U/L — SIGNIFICANT CHANGE UP (ref 40–120)
ALT FLD-CCNC: 1075 U/L — HIGH
ALT FLD-CCNC: 1157 U/L — HIGH
ANION GAP SERPL CALC-SCNC: 16 MMOL/L — SIGNIFICANT CHANGE UP (ref 5–17)
ANION GAP SERPL CALC-SCNC: 20 MMOL/L — HIGH (ref 5–17)
APTT BLD: 134.7 SEC — CRITICAL HIGH (ref 24.5–35.6)
APTT BLD: 22.8 SEC — LOW (ref 24.5–35.6)
AST SERPL-CCNC: 1111 U/L — HIGH
AST SERPL-CCNC: 878 U/L — HIGH
BASOPHILS # BLD AUTO: 0.02 K/UL — SIGNIFICANT CHANGE UP (ref 0–0.2)
BASOPHILS # BLD AUTO: 0.05 K/UL — SIGNIFICANT CHANGE UP (ref 0–0.2)
BASOPHILS NFR BLD AUTO: 0.1 % — SIGNIFICANT CHANGE UP (ref 0–2)
BASOPHILS NFR BLD AUTO: 0.3 % — SIGNIFICANT CHANGE UP (ref 0–2)
BILIRUB SERPL-MCNC: 0.7 MG/DL — SIGNIFICANT CHANGE UP (ref 0.4–2)
BILIRUB SERPL-MCNC: 0.7 MG/DL — SIGNIFICANT CHANGE UP (ref 0.4–2)
BUN SERPL-MCNC: 21.6 MG/DL — HIGH (ref 8–20)
BUN SERPL-MCNC: 23.5 MG/DL — HIGH (ref 8–20)
CALCIUM SERPL-MCNC: 9.1 MG/DL — SIGNIFICANT CHANGE UP (ref 8.4–10.5)
CALCIUM SERPL-MCNC: 9.3 MG/DL — SIGNIFICANT CHANGE UP (ref 8.4–10.5)
CHLORIDE SERPL-SCNC: 102 MMOL/L — SIGNIFICANT CHANGE UP (ref 96–108)
CHLORIDE SERPL-SCNC: 103 MMOL/L — SIGNIFICANT CHANGE UP (ref 96–108)
CO2 SERPL-SCNC: 20 MMOL/L — LOW (ref 22–29)
CO2 SERPL-SCNC: 20 MMOL/L — LOW (ref 22–29)
CREAT SERPL-MCNC: 1.16 MG/DL — SIGNIFICANT CHANGE UP (ref 0.5–1.3)
CREAT SERPL-MCNC: 1.36 MG/DL — HIGH (ref 0.5–1.3)
EGFR: 64 ML/MIN/1.73M2 — SIGNIFICANT CHANGE UP
EGFR: 78 ML/MIN/1.73M2 — SIGNIFICANT CHANGE UP
EOSINOPHIL # BLD AUTO: 0.02 K/UL — SIGNIFICANT CHANGE UP (ref 0–0.5)
EOSINOPHIL # BLD AUTO: 0.04 K/UL — SIGNIFICANT CHANGE UP (ref 0–0.5)
EOSINOPHIL NFR BLD AUTO: 0.1 % — SIGNIFICANT CHANGE UP (ref 0–6)
EOSINOPHIL NFR BLD AUTO: 0.3 % — SIGNIFICANT CHANGE UP (ref 0–6)
FLUAV AG NPH QL: SIGNIFICANT CHANGE UP
FLUBV AG NPH QL: SIGNIFICANT CHANGE UP
GLUCOSE SERPL-MCNC: 127 MG/DL — HIGH (ref 70–99)
GLUCOSE SERPL-MCNC: 158 MG/DL — HIGH (ref 70–99)
HCT VFR BLD CALC: 40 % — SIGNIFICANT CHANGE UP (ref 39–50)
HCT VFR BLD CALC: 42.6 % — SIGNIFICANT CHANGE UP (ref 39–50)
HGB BLD-MCNC: 13.3 G/DL — SIGNIFICANT CHANGE UP (ref 13–17)
HGB BLD-MCNC: 14 G/DL — SIGNIFICANT CHANGE UP (ref 13–17)
IMM GRANULOCYTES NFR BLD AUTO: 1.3 % — HIGH (ref 0–0.9)
IMM GRANULOCYTES NFR BLD AUTO: 1.4 % — HIGH (ref 0–0.9)
INR BLD: 1.06 RATIO — SIGNIFICANT CHANGE UP (ref 0.85–1.18)
INR BLD: 1.16 RATIO — SIGNIFICANT CHANGE UP (ref 0.85–1.18)
LACTATE BLDV-MCNC: 2.3 MMOL/L — HIGH (ref 0.5–2)
LACTATE SERPL-SCNC: 2.4 MMOL/L — HIGH (ref 0.5–2)
LIDOCAIN IGE QN: 39 U/L — SIGNIFICANT CHANGE UP (ref 22–51)
LYMPHOCYTES # BLD AUTO: 0.84 K/UL — LOW (ref 1–3.3)
LYMPHOCYTES # BLD AUTO: 1.11 K/UL — SIGNIFICANT CHANGE UP (ref 1–3.3)
LYMPHOCYTES # BLD AUTO: 5.1 % — LOW (ref 13–44)
LYMPHOCYTES # BLD AUTO: 8.2 % — LOW (ref 13–44)
MAGNESIUM SERPL-MCNC: 2.3 MG/DL — SIGNIFICANT CHANGE UP (ref 1.6–2.6)
MCHC RBC-ENTMCNC: 25 PG — LOW (ref 27–34)
MCHC RBC-ENTMCNC: 25 PG — LOW (ref 27–34)
MCHC RBC-ENTMCNC: 32.9 GM/DL — SIGNIFICANT CHANGE UP (ref 32–36)
MCHC RBC-ENTMCNC: 33.3 GM/DL — SIGNIFICANT CHANGE UP (ref 32–36)
MCV RBC AUTO: 75 FL — LOW (ref 80–100)
MCV RBC AUTO: 76.1 FL — LOW (ref 80–100)
MONOCYTES # BLD AUTO: 0.99 K/UL — HIGH (ref 0–0.9)
MONOCYTES # BLD AUTO: 1.18 K/UL — HIGH (ref 0–0.9)
MONOCYTES NFR BLD AUTO: 7.1 % — SIGNIFICANT CHANGE UP (ref 2–14)
MONOCYTES NFR BLD AUTO: 7.3 % — SIGNIFICANT CHANGE UP (ref 2–14)
NEUTROPHILS # BLD AUTO: 11.22 K/UL — HIGH (ref 1.8–7.4)
NEUTROPHILS # BLD AUTO: 14.24 K/UL — HIGH (ref 1.8–7.4)
NEUTROPHILS NFR BLD AUTO: 82.7 % — HIGH (ref 43–77)
NEUTROPHILS NFR BLD AUTO: 86.1 % — HIGH (ref 43–77)
PHOSPHATE SERPL-MCNC: 5 MG/DL — HIGH (ref 2.4–4.7)
PLATELET # BLD AUTO: 152 K/UL — SIGNIFICANT CHANGE UP (ref 150–400)
PLATELET # BLD AUTO: 159 K/UL — SIGNIFICANT CHANGE UP (ref 150–400)
POTASSIUM SERPL-MCNC: 3.6 MMOL/L — SIGNIFICANT CHANGE UP (ref 3.5–5.3)
POTASSIUM SERPL-MCNC: 3.7 MMOL/L — SIGNIFICANT CHANGE UP (ref 3.5–5.3)
POTASSIUM SERPL-SCNC: 3.6 MMOL/L — SIGNIFICANT CHANGE UP (ref 3.5–5.3)
POTASSIUM SERPL-SCNC: 3.7 MMOL/L — SIGNIFICANT CHANGE UP (ref 3.5–5.3)
PROT SERPL-MCNC: 5.9 G/DL — LOW (ref 6.6–8.7)
PROT SERPL-MCNC: 5.9 G/DL — LOW (ref 6.6–8.7)
PROTHROM AB SERPL-ACNC: 11.7 SEC — SIGNIFICANT CHANGE UP (ref 9.5–13)
PROTHROM AB SERPL-ACNC: 12.8 SEC — SIGNIFICANT CHANGE UP (ref 9.5–13)
RAPID RVP RESULT: SIGNIFICANT CHANGE UP
RBC # BLD: 5.33 M/UL — SIGNIFICANT CHANGE UP (ref 4.2–5.8)
RBC # BLD: 5.6 M/UL — SIGNIFICANT CHANGE UP (ref 4.2–5.8)
RBC # FLD: 15.2 % — HIGH (ref 10.3–14.5)
RBC # FLD: 15.4 % — HIGH (ref 10.3–14.5)
RSV RNA NPH QL NAA+NON-PROBE: SIGNIFICANT CHANGE UP
SARS-COV-2 RNA SPEC QL NAA+PROBE: SIGNIFICANT CHANGE UP
SARS-COV-2 RNA SPEC QL NAA+PROBE: SIGNIFICANT CHANGE UP
SODIUM SERPL-SCNC: 139 MMOL/L — SIGNIFICANT CHANGE UP (ref 135–145)
SODIUM SERPL-SCNC: 142 MMOL/L — SIGNIFICANT CHANGE UP (ref 135–145)
TROPONIN T, HIGH SENSITIVITY RESULT: 217 NG/L — HIGH (ref 0–51)
WBC # BLD: 13.57 K/UL — HIGH (ref 3.8–10.5)
WBC # BLD: 16.55 K/UL — HIGH (ref 3.8–10.5)
WBC # FLD AUTO: 13.57 K/UL — HIGH (ref 3.8–10.5)
WBC # FLD AUTO: 16.55 K/UL — HIGH (ref 3.8–10.5)

## 2024-03-16 PROCEDURE — 76705 ECHO EXAM OF ABDOMEN: CPT | Mod: 26

## 2024-03-16 PROCEDURE — 93010 ELECTROCARDIOGRAM REPORT: CPT | Mod: 76

## 2024-03-16 PROCEDURE — 71045 X-RAY EXAM CHEST 1 VIEW: CPT | Mod: 26

## 2024-03-16 PROCEDURE — 93306 TTE W/DOPPLER COMPLETE: CPT | Mod: 26

## 2024-03-16 PROCEDURE — 71275 CT ANGIOGRAPHY CHEST: CPT | Mod: 26,MC

## 2024-03-16 PROCEDURE — 93970 EXTREMITY STUDY: CPT | Mod: 26

## 2024-03-16 PROCEDURE — 70450 CT HEAD/BRAIN W/O DYE: CPT | Mod: 26

## 2024-03-16 PROCEDURE — 99285 EMERGENCY DEPT VISIT HI MDM: CPT

## 2024-03-16 RX ORDER — CHLORHEXIDINE GLUCONATE 213 G/1000ML
1 SOLUTION TOPICAL
Refills: 0 | Status: DISCONTINUED | OUTPATIENT
Start: 2024-03-16 | End: 2024-03-19

## 2024-03-16 RX ORDER — HEPARIN SODIUM 5000 [USP'U]/ML
9000 INJECTION INTRAVENOUS; SUBCUTANEOUS EVERY 6 HOURS
Refills: 0 | Status: DISCONTINUED | OUTPATIENT
Start: 2024-03-16 | End: 2024-03-17

## 2024-03-16 RX ORDER — HEPARIN SODIUM 5000 [USP'U]/ML
INJECTION INTRAVENOUS; SUBCUTANEOUS
Qty: 25000 | Refills: 0 | Status: DISCONTINUED | OUTPATIENT
Start: 2024-03-16 | End: 2024-03-17

## 2024-03-16 RX ORDER — HEPARIN SODIUM 5000 [USP'U]/ML
4000 INJECTION INTRAVENOUS; SUBCUTANEOUS EVERY 6 HOURS
Refills: 0 | Status: DISCONTINUED | OUTPATIENT
Start: 2024-03-16 | End: 2024-03-17

## 2024-03-16 RX ORDER — HEPARIN SODIUM 5000 [USP'U]/ML
9000 INJECTION INTRAVENOUS; SUBCUTANEOUS ONCE
Refills: 0 | Status: COMPLETED | OUTPATIENT
Start: 2024-03-16 | End: 2024-03-16

## 2024-03-16 RX ORDER — ALTEPLASE 100 MG
50 KIT INTRAVENOUS ONCE
Refills: 0 | Status: COMPLETED | OUTPATIENT
Start: 2024-03-16 | End: 2024-03-16

## 2024-03-16 RX ADMIN — HEPARIN SODIUM 1900 UNIT(S)/HR: 5000 INJECTION INTRAVENOUS; SUBCUTANEOUS at 18:10

## 2024-03-16 RX ADMIN — ALTEPLASE 25 MILLIGRAM(S): KIT at 21:36

## 2024-03-16 RX ADMIN — HEPARIN SODIUM 9000 UNIT(S): 5000 INJECTION INTRAVENOUS; SUBCUTANEOUS at 18:09

## 2024-03-16 RX ADMIN — HEPARIN SODIUM 0 UNIT(S)/HR: 5000 INJECTION INTRAVENOUS; SUBCUTANEOUS at 19:40

## 2024-03-16 NOTE — ED ADULT NURSE REASSESSMENT NOTE - NS ED NURSE REASSESS COMMENT FT1
pt brought from main to CC. pt noted with b/l PE's. pt started on heparin drip, followed full anticoag monogram. initial bolus given. cosigned with KAT Ledbetter at bedside for POC. MICU at bedside at this time. all questions and concerns addressed.

## 2024-03-16 NOTE — ED PROVIDER NOTE - OBJECTIVE STATEMENT
48 year old male w/PMHx of PE presents to the ED with abd pain. Patient states that today morning had sudden right upper quadrant pain followed by SOB, dizziness, and chest pain. Denies fever, chills, nausea, vomiting, cough, diarrhea, constipation, dysuria or hematuria. No sick contacts at home. Notes that he has not followed up with anyone apart from his PCP regarding prior PE. PCP stopped AC 1 year post PE episode. Denies being on any medications.

## 2024-03-16 NOTE — ED ADULT NURSE NOTE - NSFALLUNIVINTERV_ED_ALL_ED
Bed/Stretcher in lowest position, wheels locked, appropriate side rails in place/Call bell, personal items and telephone in reach/Instruct patient to call for assistance before getting out of bed/chair/stretcher/Non-slip footwear applied when patient is off stretcher/Ruby to call system/Physically safe environment - no spills, clutter or unnecessary equipment/Purposeful proactive rounding/Room/bathroom lighting operational, light cord in reach

## 2024-03-16 NOTE — ED PROVIDER NOTE - INTERNATIONAL TRAVEL
Medication requested: tamsulosin 0.4 MG Oral Cap   Is patient requesting 30 or 90 day supply:  90    Pharmacy name/location:  [facility]/pharmacy #[id number] - [address] [place] [address], [phone], [phone]     LOV:  11/09/2023    Is the patient due for appointment: NO No

## 2024-03-16 NOTE — ED PROVIDER NOTE - ATTENDING CONTRIBUTION TO CARE
High suspicion for PE on presentation, confirmed with CTA, high grade PE with RV strain, elevated troponin. Started full dose AC, will admit to ICU for further management. Cardiology consulted for possible intervention.

## 2024-03-16 NOTE — H&P ADULT - ASSESSMENT
Patient is a 49 y/o with pmhx of hyperlipidemia and PE previously on eliquis admitted to ICU in setting of:     1. PE   2. Acute hypoxic respiratory failure   3. EPIFANIO   4. Transaminitis     Neuro: Mentation intact. Ordered CT head to r/o intracranial pathology given possible need for thrombolytic therapy.   Cardio: CT with b/l PE and evidence of right heart strain. Bedside ultrasound revealing severely dilated RV with positive McConnel sign. Official TTE pending. Trop and lactate elevated. Cardiology and PERT teams contacted. Low threshold for initiate IV vasopressor, maintain MAP> 65.   Resp: Oxygenation improved after supplemental O2. Titrate for pulse ox > 90%. Consider HFNC or inhaled pulmonary dilator if respiratory status declines further.   GI: Transaminitis on initial lab work. RUQ sono with hepatomegaly, liver cysts and possible early cirrhosis. No evidence of cholecystitis. Denies hx of alcohol use. Ordered hepatitis panel and acetaminophen level.   : EPIFANIO likely from obstructive shock. Proceed with low threshold to start pressor to increase renal perfusion. Inserting perera catheter for strict I&Os.   Endo: Maintain euglycemia.   ID: Blood/urine cultures ordered. RVP pending. Monitor off abx at this time.   Heme: Started on heparin gtt. Will f/u with PERT team recs for possible administration of TPA. Trending coags. Lower ext ultrasound ordered.     CRITICAL CARE TIME SPENT: 45 minutes    Time spent evaluating/treating patient with medical issues that pose a high risk for life threatening deterioration, and/or end-organ damage, reviewing data/labs/imaging, discussing case with multidisciplinary team, discussing plan/goals of care with patient/family. Non-inclusive of procedure time. Date of entry of this note is equal to the date of services rendered.     Case Discussed with ICU Attending, Dr. Goodman  Patient is a 49 y/o with pmhx of hyperlipidemia and PE previously on eliquis admitted to ICU in setting of:     1. PE   2. Acute hypoxic respiratory failure   3. EPIFANIO   4. Transaminitis     Neuro: Mentation intact. Ordered CT head to r/o intracranial pathology given possible need for thrombolytic therapy.   Cardio: CT with b/l PE and evidence of right heart strain. Bedside ultrasound revealing severely dilated RV with positive McConnel sign. Official TTE pending. Trop and lactate elevated. Cardiology and PERT teams contacted. Low threshold for initiate IV vasopressor to maintain MAP>65.   Resp: Oxygenation improved after supplemental O2. Titrate for pulse ox > 90%. Consider HFNC or inhaled pulmonary dilator if respiratory status declines further.   GI: Transaminitis on initial lab work. RUQ sono with hepatomegaly, liver cysts and possible early cirrhosis. No evidence of cholecystitis. Denies hx of alcohol use. Ordered hepatitis panel and acetaminophen level.   : EPIFANIO likely from obstructive shock. Proceed with low threshold to start pressor to increase renal perfusion. Inserting perera catheter for strict I&Os.   Endo: Maintain euglycemia.   ID: Blood/urine cultures ordered. RVP pending. Monitor off abx at this time.   Heme: Started on heparin gtt. Will f/u with PERT team recs for possible administration of TPA. Trending coags. Lower ext ultrasound ordered.     CRITICAL CARE TIME SPENT: 45 minutes    Time spent evaluating/treating patient with medical issues that pose a high risk for life threatening deterioration, and/or end-organ damage, reviewing data/labs/imaging, discussing case with multidisciplinary team, discussing plan/goals of care with patient/family. Non-inclusive of procedure time. Date of entry of this note is equal to the date of services rendered.     Case Discussed with ICU Attending, Dr. Goodman  Patient is a 49 y/o with pmhx of hyperlipidemia and PE previously on eliquis admitted to ICU in setting of:     1. PE   2. Acute hypoxic respiratory failure   3. EPIFANIO   4. Transaminitis   5. Obstructive shock     Neuro: Mentation intact. Ordered CT head to r/o intracranial pathology given possible need for thrombolytic therapy.   Cardio: CT with b/l PE and evidence of right heart strain. Bedside ultrasound revealing severely dilated RV with positive McConnel sign. Official TTE pending. Trop and lactate elevated. Cardiology and PERT teams contacted. Low threshold for initiate IV vasopressor to maintain MAP>65.  Resp: Oxygenation improved after supplemental O2. Titrate for pulse ox > 90%. Consider HFNC or inhaled pulmonary dilator if respiratory status declines further.   GI: Transaminitis on initial lab work. RUQ sono with hepatomegaly, liver cysts and possible early cirrhosis. No evidence of cholecystitis. Denies hx of alcohol use. Ordered hepatitis panel and acetaminophen level.   : EPIFANIO likely from obstructive shock. Proceed with low threshold to start pressor to increase renal perfusion. Inserting perera catheter for strict I&Os.   Endo: Maintain euglycemia.   ID: Blood/urine cultures ordered. RVP pending. Monitor off abx at this time.   Heme: Started on heparin gtt. Will f/u with PERT team recs for possible administration of TPA. Trending coags. Lower ext ultrasound ordered.     CRITICAL CARE TIME SPENT: 45 minutes    Time spent evaluating/treating patient with medical issues that pose a high risk for life threatening deterioration, and/or end-organ damage, reviewing data/labs/imaging, discussing case with multidisciplinary team, discussing plan/goals of care with patient/family. Non-inclusive of procedure time. Date of entry of this note is equal to the date of services rendered.     Case Discussed with ICU Attending, Dr. Goodman  Patient is a 49 y/o with pmhx of hyperlipidemia and PE previously on eliquis admitted to ICU in setting of:     1. PE   2. Acute hypoxic respiratory failure   3. EPIFANIO   4. Transaminitis   5. Obstructive shock     Neuro: Mentation intact. Ordered CT head to r/o intracranial pathology given possible need for thrombolytic therapy.   Cardio: CT with b/l PE and evidence of right heart strain. Bedside ultrasound revealing severely dilated RV with positive Dow sign and septal bowing. Official TTE pending. Trop and lactate elevated. Cardiology and PERT teams contacted. Low threshold for initiate IV vasopressor to maintain MAP>65.  Resp: Oxygenation improved after supplemental O2. Titrate for pulse ox > 90%. Consider HFNC or inhaled pulmonary dilator if respiratory status declines further.   GI: Transaminitis on initial lab work. RUQ sono with hepatomegaly, liver cysts and possible early cirrhosis. No evidence of cholecystitis. Denies hx of alcohol use. Ordered hepatitis panel and acetaminophen level.   : EPIFANIO likely from obstructive shock. Proceed with low threshold to start pressor to increase renal perfusion. Inserting perera catheter for strict I&Os.   Endo: Maintain euglycemia.   ID: Blood/urine cultures ordered. RVP pending. Monitor off abx at this time.   Heme: Started on heparin gtt. Will f/u with PERT team recs for possible administration of TPA. Trending coags. Lower ext ultrasound ordered.     CRITICAL CARE TIME SPENT: 45 minutes    Time spent evaluating/treating patient with medical issues that pose a high risk for life threatening deterioration, and/or end-organ damage, reviewing data/labs/imaging, discussing case with multidisciplinary team, discussing plan/goals of care with patient/family. Non-inclusive of procedure time. Date of entry of this note is equal to the date of services rendered.     Case Discussed with ICU Attending, Dr. Goodman

## 2024-03-16 NOTE — H&P ADULT - HISTORY OF PRESENT ILLNESS
Patient is a 49 y/o with pmhx of hyperlipidemia and PE previously on eliquis which was stopped 4 months ago by PCP who presents with chest/abdominal pain. Patient states he was in his normal state of health yesterday, but felt more short of breath this morning, especially upon ambulating. Admits to syncopal episode at home. Upon arrival, patient was noted to be tachycardic to 105 and hypoxic to upper 80s, now on 2L NC. CT scan revealing b/l PE with evidence of right heart strain. Labs with elevated trop/lactate, EPIFANIO, and transaminitis. Patient now started on heparin gtt and admitted to ICU for further management.      Patient is a 47 y/o with pmhx of hyperlipidemia and unprovoked PE (2021 s/p tpa) previously on eliquis which was stopped 4 months ago by PCP who presents with chest/abdominal pain. Patient states he was in his normal state of health yesterday, but felt more short of breath this morning, especially upon ambulating. Admits to syncopal episode at home. Upon arrival, patient was noted to be tachycardic to 105 and hypoxic to upper 80s, now on 2L NC. CT scan revealing b/l PE with evidence of right heart strain. Labs with elevated trop/lactate, EPIFANIO, and transaminitis. Patient now started on heparin gtt and admitted to ICU for further management.      Patient is a 47 y/o male with pmhx of hyperlipidemia and unprovoked PE (2021) previously on Eliquis which was stopped 4 months ago by PCP who presents with chest/abdominal pain. Patient states he was in his normal state of health yesterday, but felt more short of breath this morning especially upon ambulating. Admits to syncopal episode at home. Upon arrival, patient was noted to be tachycardic to 105 and hypoxic to upper 80s, now on 2L NC. CT scan revealing b/l PE with evidence of right heart strain. Labs with elevated trop/lactate, EPIFANIO, and transaminitis. Patient now started on heparin gtt and admitted to ICU for further management of obstructive shock state secondary to recurrent PE.

## 2024-03-16 NOTE — ED PROVIDER NOTE - CLINICAL SUMMARY MEDICAL DECISION MAKING FREE TEXT BOX
48 year old male w/PMHx of PE presents to the ED with abd pain. Patient hypoxic on assessment, 88% on RA. 2L NC with improvement to 94%. Tachycardic 117. Vitals otherwise stable. Patient appears well, alert and oriented. Examination significant for right upper quadrant pain. Lungs clear bilaterally. Concern for PE. DDx includes ACS, viral URI, and/or concomitant cholecystitis/cholelithiasis.

## 2024-03-16 NOTE — ED ADULT TRIAGE NOTE - CHIEF COMPLAINT QUOTE
"pressure like pain" RUQ/RLQ abd pain ; pt reports walking makes pain worse "and I feel short of breath" . hx P.E.  in 2021

## 2024-03-16 NOTE — ED ADULT TRIAGE NOTE - MODE OF ARRIVAL
Date of Service: 10/29/2020    The patient presents, she is really here for primarily right knee pain.  It has been very bad, hard to bear weight on it.  She has been getting clicking in the knee for months since she bent down recently and has had a lot of pain.  It is all medial.  No real swelling.  She cannot take a lot of medications including anti-inflammatories and Tylenol.  She says her other knee, which we scoped some time ago, has been bothering her as well, not as bad.  That was back in December.    ALLERGIES:    Multiple.      PAST MEDICAL HISTORY:     She does have a history of a stroke and smoking.    MEDICATIONS:  Inhaler.    Bupropion.    SOCIAL HISTORY:  She does smoke 1-1/2 packs a day.    PHYSICAL EXAMINATION:   A and O times 3. NAD. Skin intact. Active extension of knee.  The right knee shows very exquisite medial joint line tenderness.  She is walking with an antalgic gait.  She has brisk cap refill distally.  Normal sensation to foot.  Skin is intact.    X-rays taken within the year show good maintenance of joint space in both knees.    ASSESSMENT:  Likely meniscus tear in the right knee.  She also wants to get an MRI on the left knee.  She has mechanical symptoms in the right knee with clicking and she has difficulty bearing weight.  I will see her back after the MRIs of both knees.      Dictated By: Noe Daniel MD  Signing Provider: Noe Daniel MD OD/kurtis ([id number])  DD: 10/29/2020 15:48:29 TD: 10/30/2020 07:46:59    Copy Sent To:      [facility] [facility] [employer] [facility]

## 2024-03-16 NOTE — H&P ADULT - NSHPLABSRESULTS_GEN_ALL_CORE
< from: CT Angio Chest PE Protocol w/ IV Cont (03.16.24 @ 17:20) >      IMPRESSION:    Bilateral pulmonary emboli, most proximally in the left pulmonary artery,   and associated right heart strain. This was discussed with Dr. Bonilla,   with read back confirmation, at 5:32 PM on 3/16/2024.    < from: US Abdomen Upper Quadrant Right (03.16.24 @ 17:09) >      IMPRESSION:  Hepatomegaly. Hepatic steatosis. Slightly micronodular contour to the   anterior margin of the liver, correlate clinically for early cirrhosis. 2   liver cysts.    Gallbladder polyps up to 0.5 cm, follow-up.    No cholelithiasis or sonographic evidence of cholecystitis.    Additional findings noted above.

## 2024-03-16 NOTE — ED ADULT NURSE REASSESSMENT NOTE - NS ED NURSE REASSESS COMMENT FT1
Pt transferred to critical. Rport given to Marcy STEPHENS. Awaiting MICU bed at this time. Heparin running. NSR on mon

## 2024-03-16 NOTE — ED ADULT NURSE NOTE - OBJECTIVE STATEMENT
Pt c/o chest pain and SOB on exertion. States he does not have these symptoms laying down. Pt on 2L NC satting at 95%. NSR on mon. Denies fevers, chills, N/V/D.

## 2024-03-16 NOTE — ED ADULT NURSE REASSESSMENT NOTE - NS ED NURSE REASSESS COMMENT FT1
As per MICU team, patient brought to CT scan for head CT, then transported upstairs without incident.

## 2024-03-16 NOTE — ED PROVIDER NOTE - NSICDXFAMILYHX_GEN_ALL_CORE_FT
FAMILY HISTORY:  No pertinent family history in first degree relatives     verbal instruction/individual instruction/written material

## 2024-03-16 NOTE — CONSULT NOTE ADULT - PROBLEM SELECTOR RECOMMENDATION 9
- pt w/ hx of acute Pulmonary embolism   - did not follow up outpatient w/ cardiology or hematology. Has no hyperocaguable workup   - previously on eliquis which was stopped 4 months ago on reccs of PCP  - now presenting with syncopal episode   - CTA shows bilateral PE   - There is evidence of obstructive shock, POCUS echo w/ mcconnel sign, lactate 2.3, LFT >1000, Trop 217   - Discussed w/ PERT Team, as per Dr. Lange pt should get TPA given obstructive shock   - discussed w/ ICU PA   - will get STAT echo   - we will follow and evaluate, if no improvement will discuss for catheter directed thrombolysis vs mechanical thrombectomy  - pt will be monitored in ICU

## 2024-03-16 NOTE — CONSULT NOTE ADULT - ASSESSMENT
This is a 48 year old male w/PMHx of PE presents to the ED with abd pain. Patient states that today morning had sudden right upper quadrant pain followed by SOB, dizziness, and chest pain. Denies fever, chills, nausea, vomiting, cough, diarrhea, constipation, dysuria or hematuria. No sick contacts at home. Notes that he has not followed up with anyone apart from his PCP regarding prior PE. PCP stopped AC 1 year post PE episode. Denies being on any medications.

## 2024-03-16 NOTE — ED ADULT NURSE REASSESSMENT NOTE - NS ED NURSE REASSESS COMMENT FT1
Critical value received from lab - ptt 134.7.  As per heparin protocol, infusion stopped.  MICU PA notified.

## 2024-03-16 NOTE — H&P ADULT - NSHPPHYSICALEXAM_GEN_ALL_CORE
Physical Examination:    General: Obese male in no acute distress.  Alert, oriented, interactive, nonfocal    HEENT: Pupils equal, reactive to light.  Symmetric.    PULM: Clear to auscultation bilaterally. On 2L NC     CVS: Sinus tachycardic     ABD: Soft, nondistended, nontender, normoactive bowel sounds, no masses    EXT: No edema, nontender    SKIN: Warm and well perfused, no rashes noted. Physical Examination:    General: Obese male in no acute distress.  Alert, oriented, interactive, nonfocal    HEENT: Pupils equal, reactive to light.  Symmetric.    PULM: Clear to auscultation bilaterally. Able to speak in full sentences     CVS: Sinus tachycardic 100s     ABD: Soft, nondistended, nontender, normoactive bowel sounds, no masses    EXT: No edema, nontender    SKIN: Warm and well perfused, no rashes noted.

## 2024-03-16 NOTE — H&P ADULT - ATTENDING COMMENTS
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 stopped 4 months ago with history of anticardiolipin antibody positive presented with sudden onset of right-sided chest pain radiating to right upper quadrant abdominal pain exertional difficulty breathing episode of dizziness and syncope admitted to ICU for    Acute bilateral (left more than right) PE  Cor pulmonale  Cardiogenic shock  Severe transaminitis: Most likely secondary to shock liver  Acute kidney injury  Acute hypoxic respiratory failure    Patient seen and examined  During my assessment patient alert awake oriented not symptomatic at rest with blood pressure normalized but remained tachycardic perform bedside point-of-care ultrasound with significantly dilated RV with Dow sign positive with elevated troponin and based on patient's history multidisciplinary plan discussed with PE response team and the plan was to admit the patient to ICU and administer half dose tPA after obtaining CT head and further intervention based on patient's clinical course.  Supplemental oxygen with nasal cannula in the meantime and post tPA to resume heparin infusion for   VTE protocol.  Will obtain interval echocardiogram in next 24 to 48 hours depending on clinical course.  Trend LFTs avoid hepatotoxic agents.  N.p.o. except medication for now.  Obtaining third peripheral IV and indwelling urinary catheter for 24 hours.  Plan of care discussed with patient and his wife at bedside with  as well as ICU team.  Will also obtain acute hepatitis panel and HIV antibody.  Hematology to see the patient through the hospital course.

## 2024-03-17 ENCOUNTER — RESULT REVIEW (OUTPATIENT)
Age: 49
End: 2024-03-17

## 2024-03-17 DIAGNOSIS — I26.09 OTHER PULMONARY EMBOLISM WITH ACUTE COR PULMONALE: ICD-10-CM

## 2024-03-17 LAB
ALBUMIN SERPL ELPH-MCNC: 3.3 G/DL — SIGNIFICANT CHANGE UP (ref 3.3–5.2)
ALP SERPL-CCNC: 62 U/L — SIGNIFICANT CHANGE UP (ref 40–120)
ALT FLD-CCNC: 756 U/L — HIGH
ANION GAP SERPL CALC-SCNC: 10 MMOL/L — SIGNIFICANT CHANGE UP (ref 5–17)
ANION GAP SERPL CALC-SCNC: 15 MMOL/L — SIGNIFICANT CHANGE UP (ref 5–17)
APAP SERPL-MCNC: <3 UG/ML — LOW (ref 10–26)
APTT BLD: 25 SEC — SIGNIFICANT CHANGE UP (ref 24.5–35.6)
APTT BLD: 44.8 SEC — HIGH (ref 24.5–35.6)
APTT BLD: 71.3 SEC — HIGH (ref 24.5–35.6)
APTT BLD: 72.8 SEC — HIGH (ref 24.5–35.6)
AST SERPL-CCNC: 244 U/L — HIGH
BILIRUB SERPL-MCNC: 0.7 MG/DL — SIGNIFICANT CHANGE UP (ref 0.4–2)
BUN SERPL-MCNC: 18.5 MG/DL — SIGNIFICANT CHANGE UP (ref 8–20)
BUN SERPL-MCNC: 22.5 MG/DL — HIGH (ref 8–20)
CALCIUM SERPL-MCNC: 8 MG/DL — LOW (ref 8.4–10.5)
CALCIUM SERPL-MCNC: 8.8 MG/DL — SIGNIFICANT CHANGE UP (ref 8.4–10.5)
CHLORIDE SERPL-SCNC: 106 MMOL/L — SIGNIFICANT CHANGE UP (ref 96–108)
CHLORIDE SERPL-SCNC: 108 MMOL/L — SIGNIFICANT CHANGE UP (ref 96–108)
CO2 SERPL-SCNC: 23 MMOL/L — SIGNIFICANT CHANGE UP (ref 22–29)
CO2 SERPL-SCNC: 25 MMOL/L — SIGNIFICANT CHANGE UP (ref 22–29)
CREAT SERPL-MCNC: 0.89 MG/DL — SIGNIFICANT CHANGE UP (ref 0.5–1.3)
CREAT SERPL-MCNC: 1.05 MG/DL — SIGNIFICANT CHANGE UP (ref 0.5–1.3)
EGFR: 106 ML/MIN/1.73M2 — SIGNIFICANT CHANGE UP
EGFR: 88 ML/MIN/1.73M2 — SIGNIFICANT CHANGE UP
GLUCOSE SERPL-MCNC: 88 MG/DL — SIGNIFICANT CHANGE UP (ref 70–99)
GLUCOSE SERPL-MCNC: 99 MG/DL — SIGNIFICANT CHANGE UP (ref 70–99)
HAV IGM SER-ACNC: SIGNIFICANT CHANGE UP
HBV CORE IGM SER-ACNC: SIGNIFICANT CHANGE UP
HBV SURFACE AG SER-ACNC: SIGNIFICANT CHANGE UP
HCT VFR BLD CALC: 38 % — LOW (ref 39–50)
HCT VFR BLD CALC: 38.4 % — LOW (ref 39–50)
HCV AB S/CO SERPL IA: 0.1 S/CO — SIGNIFICANT CHANGE UP (ref 0–0.99)
HCV AB SERPL-IMP: SIGNIFICANT CHANGE UP
HGB BLD-MCNC: 12.8 G/DL — LOW (ref 13–17)
HGB BLD-MCNC: 12.8 G/DL — LOW (ref 13–17)
HIV 1 & 2 AB SERPL IA.RAPID: SIGNIFICANT CHANGE UP
MCHC RBC-ENTMCNC: 25.1 PG — LOW (ref 27–34)
MCHC RBC-ENTMCNC: 25.2 PG — LOW (ref 27–34)
MCHC RBC-ENTMCNC: 33.3 GM/DL — SIGNIFICANT CHANGE UP (ref 32–36)
MCHC RBC-ENTMCNC: 33.7 GM/DL — SIGNIFICANT CHANGE UP (ref 32–36)
MCV RBC AUTO: 74.8 FL — LOW (ref 80–100)
MCV RBC AUTO: 75.3 FL — LOW (ref 80–100)
NT-PROBNP SERPL-SCNC: 2587 PG/ML — HIGH (ref 0–300)
PLATELET # BLD AUTO: 148 K/UL — LOW (ref 150–400)
PLATELET # BLD AUTO: 155 K/UL — SIGNIFICANT CHANGE UP (ref 150–400)
POTASSIUM SERPL-MCNC: 3.5 MMOL/L — SIGNIFICANT CHANGE UP (ref 3.5–5.3)
POTASSIUM SERPL-MCNC: 3.8 MMOL/L — SIGNIFICANT CHANGE UP (ref 3.5–5.3)
POTASSIUM SERPL-SCNC: 3.5 MMOL/L — SIGNIFICANT CHANGE UP (ref 3.5–5.3)
POTASSIUM SERPL-SCNC: 3.8 MMOL/L — SIGNIFICANT CHANGE UP (ref 3.5–5.3)
PROT SERPL-MCNC: 5.5 G/DL — LOW (ref 6.6–8.7)
RBC # BLD: 5.08 M/UL — SIGNIFICANT CHANGE UP (ref 4.2–5.8)
RBC # BLD: 5.1 M/UL — SIGNIFICANT CHANGE UP (ref 4.2–5.8)
RBC # FLD: 15.3 % — HIGH (ref 10.3–14.5)
RBC # FLD: 15.8 % — HIGH (ref 10.3–14.5)
SODIUM SERPL-SCNC: 143 MMOL/L — SIGNIFICANT CHANGE UP (ref 135–145)
SODIUM SERPL-SCNC: 143 MMOL/L — SIGNIFICANT CHANGE UP (ref 135–145)
TROPONIN T, HIGH SENSITIVITY RESULT: 105 NG/L — HIGH (ref 0–51)
WBC # BLD: 11.2 K/UL — HIGH (ref 3.8–10.5)
WBC # BLD: 11.75 K/UL — HIGH (ref 3.8–10.5)
WBC # FLD AUTO: 11.2 K/UL — HIGH (ref 3.8–10.5)
WBC # FLD AUTO: 11.75 K/UL — HIGH (ref 3.8–10.5)

## 2024-03-17 PROCEDURE — 99232 SBSQ HOSP IP/OBS MODERATE 35: CPT

## 2024-03-17 PROCEDURE — 99223 1ST HOSP IP/OBS HIGH 75: CPT

## 2024-03-17 PROCEDURE — 93321 DOPPLER ECHO F-UP/LMTD STD: CPT | Mod: 26

## 2024-03-17 PROCEDURE — 93308 TTE F-UP OR LMTD: CPT | Mod: 26

## 2024-03-17 RX ORDER — HEPARIN SODIUM 5000 [USP'U]/ML
INJECTION INTRAVENOUS; SUBCUTANEOUS
Qty: 25000 | Refills: 0 | Status: DISCONTINUED | OUTPATIENT
Start: 2024-03-17 | End: 2024-03-17

## 2024-03-17 RX ORDER — HEPARIN SODIUM 5000 [USP'U]/ML
3000 INJECTION INTRAVENOUS; SUBCUTANEOUS EVERY 6 HOURS
Refills: 0 | Status: DISCONTINUED | OUTPATIENT
Start: 2024-03-17 | End: 2024-03-19

## 2024-03-17 RX ORDER — HEPARIN SODIUM 5000 [USP'U]/ML
6000 INJECTION INTRAVENOUS; SUBCUTANEOUS EVERY 6 HOURS
Refills: 0 | Status: DISCONTINUED | OUTPATIENT
Start: 2024-03-17 | End: 2024-03-19

## 2024-03-17 RX ORDER — HEPARIN SODIUM 5000 [USP'U]/ML
1500 INJECTION INTRAVENOUS; SUBCUTANEOUS
Qty: 25000 | Refills: 0 | Status: DISCONTINUED | OUTPATIENT
Start: 2024-03-17 | End: 2024-03-19

## 2024-03-17 RX ADMIN — HEPARIN SODIUM 4000 UNIT(S): 5000 INJECTION INTRAVENOUS; SUBCUTANEOUS at 10:15

## 2024-03-17 RX ADMIN — CHLORHEXIDINE GLUCONATE 1 APPLICATION(S): 213 SOLUTION TOPICAL at 06:30

## 2024-03-17 RX ADMIN — HEPARIN SODIUM 1500 UNIT(S)/HR: 5000 INJECTION INTRAVENOUS; SUBCUTANEOUS at 23:44

## 2024-03-17 RX ADMIN — HEPARIN SODIUM 1500 UNIT(S)/HR: 5000 INJECTION INTRAVENOUS; SUBCUTANEOUS at 10:14

## 2024-03-17 RX ADMIN — HEPARIN SODIUM 1300 UNIT(S)/HR: 5000 INJECTION INTRAVENOUS; SUBCUTANEOUS at 03:37

## 2024-03-17 RX ADMIN — HEPARIN SODIUM 1500 UNIT(S)/HR: 5000 INJECTION INTRAVENOUS; SUBCUTANEOUS at 17:39

## 2024-03-17 NOTE — CONSULT NOTE ADULT - ASSESSMENT
Pleasant 47 y/o Bengali speaking male  with PMH of HLD and unprovoked PE (2021, was on Eliquis for 1 year then taken off approx 4 mo ago by his PCP  and anticardiolipin AB positive (2021.  Came to the ED with complaints of chest/abdominal pain and dyspnea with minimal exertion x 2 days. He also reported a brief syncopal episode at home.  He was noted to be tachycardic, hypotensive, and hypoxemic in ER. CT chest revealed b/l PE with evidence of right heart strain. Labs with elevated trop/lactate, EPIFANIO, and transaminitis. Pt was administered 50mg of tpa.  He is currently downgraded to SD waiting for a bed assignment.    B/L PE with RV strain   most likely R/T history of anticardiolipin AB positive in 2021  PE in 2021 was on Eliquis for 1 yr then taken off 4 mo ago by PCP  s/p TPA overnight in MICU  Cont on heparin drip per medicine  Transition to DOAC when stable for DC  Will check cardiolipin antibody total, Beta 2 glycoprotein antibody, and Lupus profile  Will need indefinite DOAC due to reoccurrence of PE  Will need a hypercoagulability , antiphospholipid workup as a out pt  Follow up with [facility] upon DC    Anemia multifactorial  Hgb on admission 13.3  Hgb today 12.8  Plt 148  Will check iron panel, ferritin, B12, folate LDH and haptoglobin  Keep HGB >8  Transfuse  PRBC if Hgb <8      Leukocytosis likely reactive  WBC  on adm 13.52  WBC today 11.75  CT chest neg for PN  Will monitor    Acute Hypoxic Respiratory Failure due to Bilateral PE with RV strain  s/p TPA overnight in MICU  TTE - preserved EF; severely enlarged RV, severely reduced systolic function, with evidence of McConells sign  repeat TTE with moderately dilated RV, no wall motion abnormalities  bedside POCUS with significant improvement per ICU team  duplex with RLE DVT  elevated trops   continue heparin gtt  2L O2 desats when removed to 80s  Cardio following      DVT ppx   heparin gtt            CT Angio Chest PE Protocol w/ IV Cont 3/16/2024    IMPRESSION:  Bilateral pulmonary emboli, most proximally in the left pulmonary artery, and associated right heart strain.    Abdomen Upper Quadrant Right 3/16/2024  IMPRESSION:  Hepatomegaly. Hepatic steatosis. Slightly micronodular contour to the anterior margin of the liver, correlate clinically for early cirrhosis. 2 liver cysts. Gallbladder polyps up to 0.5 cm, follow-up.No cholelithiasis or sonographic evidence of cholecystitis.    Thank you for consulting [facility]  [phone]     Pleasant 47 y/o Kinyarwanda speaking male  with PMH of HLD and unprovoked PE (2021, was on Eliquis for 1 year then taken off approx 4 mo ago by his PCP  and anticardiolipin AB positive (2021.  Came to the ED with complaints of chest/abdominal pain and dyspnea with minimal exertion x 2 days. He also reported a brief syncopal episode at home.  He was noted to be tachycardic, hypotensive, and hypoxemic in ER. CT chest revealed b/l PE with evidence of right heart strain. Labs with elevated trop/lactate, EPIFANIO, and transaminitis. Pt was administered 50mg of tpa.  He is currently downgraded to SD waiting for a bed assignment.    1) B/L PE with RV strain      history of anticardiolipin AB positive in 2021  PE in 2021 was on Eliquis for 1 yr then taken off 4 mo ago by PCP  s/p TPA overnight in MICU  On heparin gtt  Transition to DOAC when stable for DC  Will check cardiolipin antibody total, Beta 2 glycoprotein antibody, and Lupus anticoagulant  Will need indefinite A/C due to reoccurrence of PE  Will need a hypercoagulability ,autoimmune workup as a out pt  Follow up with Dr Adrian SHIPMAN upon DC    Anemia multifactorial  Hgb on admission 13.3  Hgb today 12.8  Plt 148  Will check iron panel, ferritin, B12, folate LDH and haptoglobin  Keep HGB >8  Transfuse  PRBC if Hgb <8      Leukocytosis likely reactive  WBC  on adm 13.52  WBC today 11.75  CT chest neg for PN  Will monitor    Acute Hypoxic Respiratory Failure due to Bilateral PE with RV strain  s/p TPA overnight in MICU  TTE - preserved EF; severely enlarged RV, severely reduced systolic function, with evidence of McConells sign  repeat TTE with moderately dilated RV, no wall motion abnormalities  bedside POCUS with significant improvement per ICU team  duplex with RLE DVT  elevated trops   continue heparin gtt  2L O2 desats when removed to 80s  Cardio following    shock liver  trend LFT    CT Angio Chest PE Protocol w/ IV Cont 3/16/2024    IMPRESSION:  Bilateral pulmonary emboli, most proximally in the left pulmonary artery, and associated right heart strain.    Abdomen Upper Quadrant Right 3/16/2024  IMPRESSION:  Hepatomegaly. Hepatic steatosis. Slightly micronodular contour to the anterior margin of the liver, correlate clinically for early cirrhosis. 2 liver cysts. Gallbladder polyps up to 0.5 cm, follow-up.No cholelithiasis or sonographic evidence of cholecystitis.    Thank you for consulting [facility]  [phone]

## 2024-03-17 NOTE — CONSULT NOTE ADULT - SUBJECTIVE AND OBJECTIVE BOX
Patient is a 49 y/o very pleasant Brazilian speaking  male  used [id number])  with PMHx of hyperlipidemia and unprovoked PE (2021) previously on Eliquis for 1 year which was stopped 4 months ago by PCP who presents with chest/abdominal pain. Patient states he was in his normal state of health yesterday, but felt more short of breath this morning especially upon ambulating. Admits to syncopal episode at home. Upon arrival to the ED  patient was noted to be tachycardic to 105 and hypoxic to upper 80s, now on 2L NC. CT scan 3/16/53336 revealing b/l PE with evidence of right heart strain. Labs with elevated trop/lactate, EPIFANIO, and transaminitis. Patient was started on heparin gtt and admitted to ICU for further management of obstructive shock state secondary to recurrent PE.     3/17/2024  Pt seen at bedside this afternoon in MICU, pt oob sitting in a chair s/p TPA overnight .  He states she is feeling much better.  He is on NC 2L sat at 92%.  He stated that  when the O2 is off he desats into the 80s.  He denies SOB, CP, dizziness.    PAST MEDICAL & SURGICAL HISTORY:  Pulmonary embolism      No significant past surgical history      Home Medications:  ferrous sulfate 325 mg (65 mg elemental iron) oral tablet: 1 tab(s) orally once a day  ·Eliquis Starter Pack for Treatment of DVT and PE 5 mg oral tablet: Take as directed per box 2 tablets every 12 hours x 7 days then 1 tablet every 12 hours after  was taken off 4 mo ago  losartan 100 mg oral tablet: 1 tab(s) orally once a day  ·albuterol 90 mcg/inh inhalation aerosol: 2 puff(s) inhaled every 6 hours, As Needed shortness of breath  amLODIPine 5 mg oral tablet: 1 tab(s) orally once a day    FAMILY HISTORY:  No pertinent family history in first degree relatives          Social History:  denies alcohol smoking      MEDICATIONS  (STANDING):  chlorhexidine 2% Cloths 1 Application(s) Topical <User Schedule>  heparin  Infusion. 1500 Unit(s)/Hr (15 mL/Hr) IV Continuous <Continuous>    MEDICATIONS  (PRN):  heparin   Injectable 6000 Unit(s) IV Push every 6 hours PRN For aPTT less than 40  heparin   Injectable 3000 Unit(s) IV Push every 6 hours PRN For aPTT between 40 - 57          ICU Vital Signs Last 24 Hrs  T(C): 36.8 (17 Mar 2024 15:00), Max: 37.1 (16 Mar 2024 23:15)  T(F): 98.2 (17 Mar 2024 15:00), Max: 98.7 (16 Mar 2024 23:15)  HR: 72 (17 Mar 2024 15:00) (65 - 94)  BP: 128/97 (17 Mar 2024 15:00) (98/82 - 142/99)  BP(mean): 109 (17 Mar 2024 15:00) (77 - 116)  ABP: --  ABP(mean): --  RR: 18 (17 Mar 2024 15:00) (10 - 29)  SpO2: 94% (17 Mar 2024 15:00) (88% - 98%)    O2 Parameters below as of 17 Mar 2024 09:00  Patient On (Oxygen Delivery Method): nasal cannula  O2 Flow (L/min): 2    Labs  CBC                        12.8   11.75 )-----------( 148      ( 17 Mar 2024 09:30 )             38.0       hem  03-17    143  |  108  |  18.5  ----------------------------<  88  3.5   |  25.0  |  0.89    Ca    8.0<L>      17 Mar 2024 09:30  Phos  5.0     03-16  Mg     2.3     03-16    TPro  5.5<L>  /  Alb  3.3  /  TBili  0.7  /  DBili  x   /  AST  244<H>  /  ALT  756<H>  /  AlkPhos  62  03-17      Physical Exam:   General: Obese male in no acute distress.  Alert, oriented, interactive, nonfocal  HEENT: Pupils equal, reactive to light.  Symmetric.  PULM: Clear to auscultation bilaterally. Able to speak in full sentences   CVS: Sinus trythem 72  ABD: Soft, nondistended, nontender, normoactive bowel sounds, no masses  EXT: No edema, nontender  SKIN: Warm and well perfused, no rashes noted.
CHIEF COMPLAINT/INTERVAL HISTORY:  Marlon at bedside    Patient is a 48y old  Male who presents with a chief complaint of PE (17 Mar 2024 00:27)      HPI:  Patient is a 49 y/o male with pmhx of hyperlipidemia and unprovoked PE (2021) previously on Eliquis which was stopped 4 months ago by PCP who presents with chest/abdominal pain. Patient states he was in his normal state of health yesterday, but felt more short of breath this morning especially upon ambulating. Admits to syncopal episode at home. Upon arrival, patient was noted to be tachycardic to 105 and hypoxic to upper 80s, now on 2L NC. CT scan revealing b/l PE with evidence of right heart strain. Labs with elevated trop/lactate, EPIFANIO, and transaminitis. Patient now started on heparin gtt and admitted to ICU for further management of obstructive shock state secondary to recurrent PE. s/p TPA overnight. Medically stable for downgrade to medicine.    SUBJECTIVE & OBJECTIVE: Pt seen and examined at bedside. s/p TPA overnight. OOB to chair; reports feeling much better.    ROS: No chest pain, palpitations, SOB, light headedness, dizziness, headache, nausea/vomiting, fevers/chills, abdominal pain, dysuria or increased urinary frequency.    PSH: none  Fam Hx: non contributory  Social Hx: denies tobacco, ETOH and drug use    ICU Vital Signs Last 24 Hrs  T(C): 36.8 (17 Mar 2024 11:09), Max: 37.1 (16 Mar 2024 23:15)  T(F): 98.2 (17 Mar 2024 11:09), Max: 98.7 (16 Mar 2024 23:15)  HR: 82 (17 Mar 2024 13:00) (65 - 94)  BP: 112/78 (17 Mar 2024 13:00) (98/82 - 142/99)  BP(mean): 87 (17 Mar 2024 13:00) (77 - 116)  ABP: --  ABP(mean): --  RR: 21 (17 Mar 2024 13:00) (10 - 29)  SpO2: 93% (17 Mar 2024 13:00) (88% - 98%)    O2 Parameters below as of 17 Mar 2024 09:00  Patient On (Oxygen Delivery Method): nasal cannula  O2 Flow (L/min): 2    MEDICATIONS  (STANDING):  chlorhexidine 2% Cloths 1 Application(s) Topical <User Schedule>  heparin  Infusion. 1500 Unit(s)/Hr (15 mL/Hr) IV Continuous <Continuous>    MEDICATIONS  (PRN):  heparin   Injectable 6000 Unit(s) IV Push every 6 hours PRN For aPTT less than 40  heparin   Injectable 3000 Unit(s) IV Push every 6 hours PRN For aPTT between 40 - 57      LABS:                        12.8   11.75 )-----------( 148      ( 17 Mar 2024 09:30 )             38.0     03-17    143  |  108  |  18.5  ----------------------------<  88  3.5   |  25.0  |  0.89    Ca    8.0<L>      17 Mar 2024 09:30  Phos  5.0     03-16  Mg     2.3     03-16    TPro  5.5<L>  /  Alb  3.3  /  TBili  0.7  /  DBili  x   /  AST  244<H>  /  ALT  756<H>  /  AlkPhos  62  03-17    PT/INR - ( 16 Mar 2024 18:50 )   PT: 12.8 sec;   INR: 1.16 ratio         PTT - ( 17 Mar 2024 09:30 )  PTT:44.8 sec  Urinalysis Basic - ( 17 Mar 2024 09:30 )    Color: x / Appearance: x / SG: x / pH: x  Gluc: 88 mg/dL / Ketone: x  / Bili: x / Urobili: x   Blood: x / Protein: x / Nitrite: x   Leuk Esterase: x / RBC: x / WBC x   Sq Epi: x / Non Sq Epi: x / Bacteria: x    PHYSICAL EXAM:    GENERAL: NAD, well-groomed, well-developed  HEAD:  Atraumatic, Normocephalic  EYES: EOMI, PERRLA, conjunctiva and sclera clear  ENMT: Moist mucous membranes  NECK: Supple   NERVOUS SYSTEM:  Alert & Oriented X3, Motor Strength 5/5 B/L upper and lower extremities   CHEST/LUNG: bilateral air entry  HEART: Regular rate and rhythm; + S1/S2  ABDOMEN: Soft, Nontender, obese; Bowel sounds present  EXTREMITIES:  no pedal edema
                                             [facility]                                              CARDIOLOGY AT [place] [address]                                             Telephone: [phone]. Fax:[phone]                                                       CARDIOLOGY CONSULTATION NOTE                                                                                             History obtained by: Patient and medical record   Community Cardiologist: None   obtained: Yes [ x ] No [  ]  Reason for Consultation: Acute Pulmonary Embolism   Available out pt records reviewed: Yes [ x ] No [  ]    Chief complaint:    Patient is a 48y old  Male who presents with a chief complaint of syncope     HPI:  This is a 48 year old male w/PMHx of PE presents to the ED with abd pain. Patient states that today morning had sudden right upper quadrant pain followed by SOB, dizziness, and chest pain. Denies fever, chills, nausea, vomiting, cough, diarrhea, constipation, dysuria or hematuria. No sick contacts at home. Notes that he has not followed up with anyone apart from his PCP regarding prior PE. PCP stopped AC 1 year post PE episode. Denies being on any medications.    PAST MEDICAL HISTORY  No pertinent past medical history    Pulmonary embolism        PAST SURGICAL HISTORY  No significant past surgical history        SUBSTANCE USE HISTORY  Denies current and previous substance use [  ]   CIGARETTES -   ALCOHOL -   DRUGS -     FAMILY HISTORY:  No pertinent family history in first degree relatives        CARDIAC SPECIFIC FAMILY HX   No KNOWN family history of Cardiovascular disease, CAD, or sudden death in first degree relatives unless specified below  Family History of Cardiovascular Disease:  [  ]   Coronary Artery Disease in first degree relative:  [  ]   Sudden Cardiac Death in First degree relative: [  ]    HOME MEDICATIONS:      CURRENT CARDIAC MEDICATIONS:      CURRENT OTHER MEDICATIONS:  chlorhexidine 2% Cloths 1 Application(s) Topical <User Schedule>  heparin   Injectable 9000 Unit(s) IV Push every 6 hours PRN For aPTT less than 40  heparin   Injectable 4000 Unit(s) IV Push every 6 hours PRN For aPTT between 40 - 57  heparin  Infusion.  Unit(s)/Hr (19 mL/Hr) IV Continuous <Continuous>      ALLERGIES:   No Known Allergies      VITAL SIGNS:  T(C): 36.4 (24 @ 14:19), Max: 36.4 (24 @ 14:19)  T(F): 97.5 (24 @ 14:19), Max: 97.5 (24 @ 14:19)  HR: 113 (24 @ 14:19) (113 - 113)  BP: 92/67 (24 @ 14:19) (92/67 - 92/67)  RR: 17 (24 @ 14:19) (17 - 17)  SpO2: 97% (24 @ 14:19) (97% - 97%)    INTAKE AND OUTPUT:       LABS:                            14.0   16.55 )-----------( 152      ( 16 Mar 2024 15:27 )             42.6     -16    142  |  102  |  21.6<H>  ----------------------------<  158<H>  3.7   |  20.0<L>  |  1.36<H>    Ca    9.3      16 Mar 2024 15:27    TPro  5.9<L>  /  Alb  3.6  /  TBili  0.7  /  DBili  x   /  AST  1111<H>  /  ALT  1157<H>  /  AlkPhos  74  03-16    PT/INR - ( 16 Mar 2024 15:27 )   PT: 11.7 sec;   INR: 1.06 ratio         PTT - ( 16 Mar 2024 15:27 )  PTT:22.8 sec  Urinalysis Basic - ( 16 Mar 2024 15:27 )    Color: x / Appearance: x / SG: x / pH: x  Gluc: 158 mg/dL / Ketone: x  / Bili: x / Urobili: x   Blood: x / Protein: x / Nitrite: x   Leuk Esterase: x / RBC: x / WBC x   Sq Epi: x / Non Sq Epi: x / Bacteria: x              RADIOLOGY IMAGIN Lead ECG (24 @ 16:57) [Completed]  Xray Chest 1 View- PORTABLE-Urgent: Urgent   Indication: chest pain  Transport: Portable  Exam Completed  Provider's Contact #: [phone] (24 @ 15:58) [Performed]  CT Angio Chest PE Protocol w/ IV Cont: Urgent   Indication: concern for PE  Transport: Stretcher-Crib  Exam Completed (24 @ 15:03) [Results Available]

## 2024-03-18 DIAGNOSIS — R79.89 OTHER SPECIFIED ABNORMAL FINDINGS OF BLOOD CHEMISTRY: ICD-10-CM

## 2024-03-18 DIAGNOSIS — I10 ESSENTIAL (PRIMARY) HYPERTENSION: ICD-10-CM

## 2024-03-18 LAB
ALBUMIN SERPL ELPH-MCNC: 3.5 G/DL — SIGNIFICANT CHANGE UP (ref 3.3–5.2)
ALP SERPL-CCNC: 66 U/L — SIGNIFICANT CHANGE UP (ref 40–120)
ALT FLD-CCNC: 572 U/L — HIGH
ANION GAP SERPL CALC-SCNC: 11 MMOL/L — SIGNIFICANT CHANGE UP (ref 5–17)
APTT 50/50 2HOUR INCUB: 56.4 SEC — HIGH (ref 24.5–36.6)
APTT BLD: 47.7 SEC — HIGH (ref 24.5–36.6)
APTT BLD: 60.6 SEC — HIGH (ref 24.5–35.6)
APTT BLD: 71.4 SEC — HIGH (ref 24.5–35.6)
APTT BLD: 73.1 SEC — HIGH (ref 24.5–35.6)
AST SERPL-CCNC: 111 U/L — HIGH
BASOPHILS # BLD AUTO: 0.04 K/UL — SIGNIFICANT CHANGE UP (ref 0–0.2)
BASOPHILS NFR BLD AUTO: 0.4 % — SIGNIFICANT CHANGE UP (ref 0–2)
BILIRUB SERPL-MCNC: 0.4 MG/DL — SIGNIFICANT CHANGE UP (ref 0.4–2)
BUN SERPL-MCNC: 18.1 MG/DL — SIGNIFICANT CHANGE UP (ref 8–20)
CALCIUM SERPL-MCNC: 8.3 MG/DL — LOW (ref 8.4–10.5)
CHLORIDE SERPL-SCNC: 108 MMOL/L — SIGNIFICANT CHANGE UP (ref 96–108)
CHOLEST SERPL-MCNC: 135 MG/DL — SIGNIFICANT CHANGE UP
CK SERPL-CCNC: 36 U/L — SIGNIFICANT CHANGE UP (ref 30–200)
CO2 SERPL-SCNC: 27 MMOL/L — SIGNIFICANT CHANGE UP (ref 22–29)
CREAT SERPL-MCNC: 0.97 MG/DL — SIGNIFICANT CHANGE UP (ref 0.5–1.3)
DRVVT RATIO: 0.96 RATIO — SIGNIFICANT CHANGE UP (ref 0–1.21)
DRVVT SCREEN TO CONFIRM RATIO: SIGNIFICANT CHANGE UP
EGFR: 96 ML/MIN/1.73M2 — SIGNIFICANT CHANGE UP
EOSINOPHIL # BLD AUTO: 0.36 K/UL — SIGNIFICANT CHANGE UP (ref 0–0.5)
EOSINOPHIL NFR BLD AUTO: 3.3 % — SIGNIFICANT CHANGE UP (ref 0–6)
FERRITIN SERPL-MCNC: 106 NG/ML — SIGNIFICANT CHANGE UP (ref 30–400)
FOLATE SERPL-MCNC: 9.6 NG/ML — SIGNIFICANT CHANGE UP
GLUCOSE SERPL-MCNC: 103 MG/DL — HIGH (ref 70–99)
HAPTOGLOB SERPL-MCNC: 255 MG/DL — HIGH (ref 34–200)
HAV IGM SER-ACNC: SIGNIFICANT CHANGE UP
HBV CORE IGM SER-ACNC: SIGNIFICANT CHANGE UP
HBV SURFACE AG SER-ACNC: SIGNIFICANT CHANGE UP
HCT VFR BLD CALC: 40.2 % — SIGNIFICANT CHANGE UP (ref 39–50)
HCT VFR BLD CALC: 40.9 % — SIGNIFICANT CHANGE UP (ref 39–50)
HCV AB S/CO SERPL IA: 0.1 S/CO — SIGNIFICANT CHANGE UP (ref 0–0.99)
HCV AB SERPL-IMP: SIGNIFICANT CHANGE UP
HDLC SERPL-MCNC: 41 MG/DL — SIGNIFICANT CHANGE UP
HGB BLD-MCNC: 13.2 G/DL — SIGNIFICANT CHANGE UP (ref 13–17)
HGB BLD-MCNC: 13.5 G/DL — SIGNIFICANT CHANGE UP (ref 13–17)
IMM GRANULOCYTES NFR BLD AUTO: 1.8 % — HIGH (ref 0–0.9)
IRON SATN MFR SERPL: 10 % — LOW (ref 16–55)
IRON SATN MFR SERPL: 34 UG/DL — LOW (ref 59–158)
LIPID PNL WITH DIRECT LDL SERPL: 63 MG/DL — SIGNIFICANT CHANGE UP
LYMPHOCYTES # BLD AUTO: 1.26 K/UL — SIGNIFICANT CHANGE UP (ref 1–3.3)
LYMPHOCYTES # BLD AUTO: 11.5 % — LOW (ref 13–44)
MAGNESIUM SERPL-MCNC: 2.2 MG/DL — SIGNIFICANT CHANGE UP (ref 1.6–2.6)
MCHC RBC-ENTMCNC: 25.1 PG — LOW (ref 27–34)
MCHC RBC-ENTMCNC: 25.2 PG — LOW (ref 27–34)
MCHC RBC-ENTMCNC: 32.8 GM/DL — SIGNIFICANT CHANGE UP (ref 32–36)
MCHC RBC-ENTMCNC: 33 GM/DL — SIGNIFICANT CHANGE UP (ref 32–36)
MCV RBC AUTO: 76.3 FL — LOW (ref 80–100)
MCV RBC AUTO: 76.6 FL — LOW (ref 80–100)
MONOCYTES # BLD AUTO: 0.95 K/UL — HIGH (ref 0–0.9)
MONOCYTES NFR BLD AUTO: 8.6 % — SIGNIFICANT CHANGE UP (ref 2–14)
NEUTROPHILS # BLD AUTO: 8.19 K/UL — HIGH (ref 1.8–7.4)
NEUTROPHILS NFR BLD AUTO: 74.4 % — SIGNIFICANT CHANGE UP (ref 43–77)
NON HDL CHOLESTEROL: 94 MG/DL — SIGNIFICANT CHANGE UP
NORMALIZED SCT PPP-RTO: 0.89 RATIO — SIGNIFICANT CHANGE UP (ref 0–1.16)
NORMALIZED SCT PPP-RTO: SIGNIFICANT CHANGE UP
NT-PROBNP SERPL-SCNC: 1737 PG/ML — HIGH (ref 0–300)
PAT CTL 2H: 50.9 SEC — HIGH (ref 24.5–36.6)
PHOSPHATE SERPL-MCNC: 3.9 MG/DL — SIGNIFICANT CHANGE UP (ref 2.4–4.7)
PLATELET # BLD AUTO: 149 K/UL — LOW (ref 150–400)
PLATELET # BLD AUTO: 154 K/UL — SIGNIFICANT CHANGE UP (ref 150–400)
POTASSIUM SERPL-MCNC: 3.6 MMOL/L — SIGNIFICANT CHANGE UP (ref 3.5–5.3)
POTASSIUM SERPL-SCNC: 3.6 MMOL/L — SIGNIFICANT CHANGE UP (ref 3.5–5.3)
PROT SERPL-MCNC: 5.9 G/DL — LOW (ref 6.6–8.7)
RBC # BLD: 5.25 M/UL — SIGNIFICANT CHANGE UP (ref 4.2–5.8)
RBC # BLD: 5.36 M/UL — SIGNIFICANT CHANGE UP (ref 4.2–5.8)
RBC # FLD: 15.9 % — HIGH (ref 10.3–14.5)
RBC # FLD: 15.9 % — HIGH (ref 10.3–14.5)
SODIUM SERPL-SCNC: 146 MMOL/L — HIGH (ref 135–145)
TIBC SERPL-MCNC: 337 UG/DL — SIGNIFICANT CHANGE UP (ref 220–430)
TRANSFERRIN SERPL-MCNC: 236 MG/DL — SIGNIFICANT CHANGE UP (ref 180–329)
TRIGL SERPL-MCNC: 154 MG/DL — HIGH
VIT B12 SERPL-MCNC: 712 PG/ML — SIGNIFICANT CHANGE UP (ref 232–1245)
WBC # BLD: 10.95 K/UL — HIGH (ref 3.8–10.5)
WBC # BLD: 11 K/UL — HIGH (ref 3.8–10.5)
WBC # FLD AUTO: 10.95 K/UL — HIGH (ref 3.8–10.5)
WBC # FLD AUTO: 11 K/UL — HIGH (ref 3.8–10.5)

## 2024-03-18 PROCEDURE — 99233 SBSQ HOSP IP/OBS HIGH 50: CPT

## 2024-03-18 PROCEDURE — 99234 HOSP IP/OBS SM DT SF/LOW 45: CPT

## 2024-03-18 RX ADMIN — HEPARIN SODIUM 1500 UNIT(S)/HR: 5000 INJECTION INTRAVENOUS; SUBCUTANEOUS at 13:12

## 2024-03-18 RX ADMIN — CHLORHEXIDINE GLUCONATE 1 APPLICATION(S): 213 SOLUTION TOPICAL at 02:29

## 2024-03-18 NOTE — PROGRESS NOTE ADULT - PROBLEM SELECTOR PLAN 1
s/p TPA    Echo -severely reduced RV systolic function  RSvp 50MMhg   BNP 1737  Trop 217-105  Plan  decrease oxygen  Have patient ambulate  Repeat limited echo today s/p TPA    Echo -severely reduced RV systolic function  RSvp 50MMhg   BNP 1737  Trop 217-105  Plan  decrease oxygen  Have patient ambulate s/p TPA    Echo -severely reduced RV systolic function  RSvp 50MMhg   BNP 1737  Trop 217-105  Plan  decrease oxygen  Have patient ambulate  Noac not ideal with lupus anticoagulant    Medicine to reach out to heme re coumadin

## 2024-03-18 NOTE — CHART NOTE - NSCHARTNOTEFT_GEN_A_CORE
Pt seen and examined at the bedside this morning. Case was discussed with pt via  service. No current complaints. Bedside POCUS performed, RV improved compared to yesterday evening. He remains on heparin infusion per full anticoagulation protocol. Will ultimately need to be transition to DOAC prior to discharge. Likely no further invasive measures per cardiology.    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 He also endorsed a brief syncopal episode at home (15-20 seconds). Noted to be tachycardic, hypotensive, and hypoxemic in ER. CT chest revealed b/l PE with evidence of right heart strain. Labs with elevated trop/lactate, EPIFANIO, and transaminitis. Pt was administered 50mg of tpa at 21:30 over 2 hrs.    At this time, pt is stable for transfer to step down unit.    Case discussed with ICU attending, Dr. Goodman.
Nutrition Note: Aware pt downgraded from ICU. Chart reviewed; RD to follow up with full nutrition assessment per medical floor protocol.
Post PERT discussion: plan for half dose tPA     50 mg TPA initiated at 9:30 pm, will be going for 2 hours and to be completed by 11:30 pm.    Ct head pre-tpa negative for intracranial pathology

## 2024-03-18 NOTE — PROGRESS NOTE ADULT - PROBLEM SELECTOR PLAN 3
? Cirrhosis  needs work up  Hepatitis screen negative  sono abdominal  Hepatomegaly. Hepatic steatosis. Slightly micronodular contour to the   anterior margin of the liver, correlate clinically for early cirrhosis. 2   liver cysts.

## 2024-03-19 ENCOUNTER — TRANSCRIPTION ENCOUNTER (OUTPATIENT)
Age: 49
End: 2024-03-19

## 2024-03-19 VITALS
RESPIRATION RATE: 16 BRPM | HEART RATE: 84 BPM | SYSTOLIC BLOOD PRESSURE: 144 MMHG | DIASTOLIC BLOOD PRESSURE: 100 MMHG | OXYGEN SATURATION: 95 %

## 2024-03-19 LAB
APTT BLD: 63 SEC — HIGH (ref 24.5–35.6)
HCT VFR BLD CALC: 43.1 % — SIGNIFICANT CHANGE UP (ref 39–50)
HGB BLD-MCNC: 14 G/DL — SIGNIFICANT CHANGE UP (ref 13–17)
MCHC RBC-ENTMCNC: 25 PG — LOW (ref 27–34)
MCHC RBC-ENTMCNC: 32.5 GM/DL — SIGNIFICANT CHANGE UP (ref 32–36)
MCV RBC AUTO: 77 FL — LOW (ref 80–100)
PLATELET # BLD AUTO: 163 K/UL — SIGNIFICANT CHANGE UP (ref 150–400)
RBC # BLD: 5.6 M/UL — SIGNIFICANT CHANGE UP (ref 4.2–5.8)
RBC # FLD: 16.4 % — HIGH (ref 10.3–14.5)
WBC # BLD: 11.98 K/UL — HIGH (ref 3.8–10.5)
WBC # FLD AUTO: 11.98 K/UL — HIGH (ref 3.8–10.5)

## 2024-03-19 PROCEDURE — 82728 ASSAY OF FERRITIN: CPT

## 2024-03-19 PROCEDURE — 86703 HIV-1/HIV-2 1 RESULT ANTBDY: CPT

## 2024-03-19 PROCEDURE — 83880 ASSAY OF NATRIURETIC PEPTIDE: CPT

## 2024-03-19 PROCEDURE — 36415 COLL VENOUS BLD VENIPUNCTURE: CPT

## 2024-03-19 PROCEDURE — 87040 BLOOD CULTURE FOR BACTERIA: CPT

## 2024-03-19 PROCEDURE — 84466 ASSAY OF TRANSFERRIN: CPT

## 2024-03-19 PROCEDURE — 83735 ASSAY OF MAGNESIUM: CPT

## 2024-03-19 PROCEDURE — 82550 ASSAY OF CK (CPK): CPT

## 2024-03-19 PROCEDURE — 83690 ASSAY OF LIPASE: CPT

## 2024-03-19 PROCEDURE — 93970 EXTREMITY STUDY: CPT

## 2024-03-19 PROCEDURE — 71045 X-RAY EXAM CHEST 1 VIEW: CPT

## 2024-03-19 PROCEDURE — 84100 ASSAY OF PHOSPHORUS: CPT

## 2024-03-19 PROCEDURE — 85613 RUSSELL VIPER VENOM DILUTED: CPT

## 2024-03-19 PROCEDURE — 99285 EMERGENCY DEPT VISIT HI MDM: CPT

## 2024-03-19 PROCEDURE — 84484 ASSAY OF TROPONIN QUANT: CPT

## 2024-03-19 PROCEDURE — 85027 COMPLETE CBC AUTOMATED: CPT

## 2024-03-19 PROCEDURE — 99232 SBSQ HOSP IP/OBS MODERATE 35: CPT

## 2024-03-19 PROCEDURE — T1013: CPT

## 2024-03-19 PROCEDURE — 80307 DRUG TEST PRSMV CHEM ANLYZR: CPT

## 2024-03-19 PROCEDURE — 93321 DOPPLER ECHO F-UP/LMTD STD: CPT

## 2024-03-19 PROCEDURE — 83010 ASSAY OF HAPTOGLOBIN QUANT: CPT

## 2024-03-19 PROCEDURE — 85025 COMPLETE CBC W/AUTO DIFF WBC: CPT

## 2024-03-19 PROCEDURE — 80074 ACUTE HEPATITIS PANEL: CPT

## 2024-03-19 PROCEDURE — 70450 CT HEAD/BRAIN W/O DYE: CPT | Mod: MC

## 2024-03-19 PROCEDURE — 0225U NFCT DS DNA&RNA 21 SARSCOV2: CPT

## 2024-03-19 PROCEDURE — 96365 THER/PROPH/DIAG IV INF INIT: CPT

## 2024-03-19 PROCEDURE — 86146 BETA-2 GLYCOPROTEIN ANTIBODY: CPT

## 2024-03-19 PROCEDURE — 83605 ASSAY OF LACTIC ACID: CPT

## 2024-03-19 PROCEDURE — 93308 TTE F-UP OR LMTD: CPT

## 2024-03-19 PROCEDURE — 83540 ASSAY OF IRON: CPT

## 2024-03-19 PROCEDURE — 85730 THROMBOPLASTIN TIME PARTIAL: CPT

## 2024-03-19 PROCEDURE — 83550 IRON BINDING TEST: CPT

## 2024-03-19 PROCEDURE — 85610 PROTHROMBIN TIME: CPT

## 2024-03-19 PROCEDURE — 87637 SARSCOV2&INF A&B&RSV AMP PRB: CPT

## 2024-03-19 PROCEDURE — 76705 ECHO EXAM OF ABDOMEN: CPT

## 2024-03-19 PROCEDURE — 86147 CARDIOLIPIN ANTIBODY EA IG: CPT

## 2024-03-19 PROCEDURE — 80048 BASIC METABOLIC PNL TOTAL CA: CPT

## 2024-03-19 PROCEDURE — 99239 HOSP IP/OBS DSCHRG MGMT >30: CPT

## 2024-03-19 PROCEDURE — 85732 THROMBOPLASTIN TIME PARTIAL: CPT

## 2024-03-19 PROCEDURE — 82746 ASSAY OF FOLIC ACID SERUM: CPT

## 2024-03-19 PROCEDURE — 71275 CT ANGIOGRAPHY CHEST: CPT | Mod: MC

## 2024-03-19 PROCEDURE — 82607 VITAMIN B-12: CPT

## 2024-03-19 PROCEDURE — 80053 COMPREHEN METABOLIC PANEL: CPT

## 2024-03-19 PROCEDURE — 93005 ELECTROCARDIOGRAM TRACING: CPT

## 2024-03-19 PROCEDURE — 93306 TTE W/DOPPLER COMPLETE: CPT

## 2024-03-19 PROCEDURE — 80061 LIPID PANEL: CPT

## 2024-03-19 PROCEDURE — C8924: CPT

## 2024-03-19 RX ORDER — APIXABAN 2.5 MG/1
1 TABLET, FILM COATED ORAL
Qty: 74 | Refills: 0
Start: 2024-03-19 | End: 2024-04-17

## 2024-03-19 RX ORDER — AMLODIPINE BESYLATE 2.5 MG/1
1 TABLET ORAL
Qty: 30 | Refills: 0
Start: 2024-03-19 | End: 2024-04-17

## 2024-03-19 RX ORDER — ALBUTEROL 90 UG/1
2 AEROSOL, METERED ORAL
Qty: 1 | Refills: 0
Start: 2024-03-19 | End: 2024-04-17

## 2024-03-19 RX ORDER — LOSARTAN POTASSIUM 100 MG/1
1 TABLET, FILM COATED ORAL
Qty: 30 | Refills: 0
Start: 2024-03-19 | End: 2024-04-17

## 2024-03-19 RX ORDER — APIXABAN 2.5 MG/1
10 TABLET, FILM COATED ORAL
Refills: 0 | Status: DISCONTINUED | OUTPATIENT
Start: 2024-03-19 | End: 2024-03-19

## 2024-03-19 RX ORDER — LOSARTAN POTASSIUM 100 MG/1
25 TABLET, FILM COATED ORAL DAILY
Refills: 0 | Status: DISCONTINUED | OUTPATIENT
Start: 2024-03-19 | End: 2024-03-19

## 2024-03-19 RX ADMIN — HEPARIN SODIUM 1500 UNIT(S)/HR: 5000 INJECTION INTRAVENOUS; SUBCUTANEOUS at 06:42

## 2024-03-19 RX ADMIN — CHLORHEXIDINE GLUCONATE 1 APPLICATION(S): 213 SOLUTION TOPICAL at 05:29

## 2024-03-19 RX ADMIN — HEPARIN SODIUM 1500 UNIT(S)/HR: 5000 INJECTION INTRAVENOUS; SUBCUTANEOUS at 03:14

## 2024-03-19 RX ADMIN — LOSARTAN POTASSIUM 25 MILLIGRAM(S): 100 TABLET, FILM COATED ORAL at 09:28

## 2024-03-19 RX ADMIN — APIXABAN 10 MILLIGRAM(S): 2.5 TABLET, FILM COATED ORAL at 10:24

## 2024-03-19 RX ADMIN — HEPARIN SODIUM 1500 UNIT(S)/HR: 5000 INJECTION INTRAVENOUS; SUBCUTANEOUS at 07:25

## 2024-03-19 RX ADMIN — HEPARIN SODIUM 1500 UNIT(S)/HR: 5000 INJECTION INTRAVENOUS; SUBCUTANEOUS at 00:06

## 2024-03-19 NOTE — DISCHARGE NOTE PROVIDER - NSDCMRMEDTOKEN_GEN_ALL_CORE_FT
albuterol 90 mcg/inh inhalation aerosol: 2 puff(s) inhaled every 6 hours, As Needed shortness of breath  amLODIPine 5 mg oral tablet: 1 tab(s) orally once a day  Eliquis Starter Pack for Treatment of DVT and PE 5 mg oral tablet: 1 tab(s) orally 2 times a day Take as directed per box 2 tablets every 12 hours x 7 days then 1 tablet every 12 hours after  ferrous sulfate 325 mg (65 mg elemental iron) oral tablet: 1 tab(s) orally once a day  losartan 100 mg oral tablet: 1 tab(s) orally once a day   albuterol 90 mcg/inh inhalation aerosol: 2 puff(s) inhaled every 6 hours as needed for shortness of breath  amLODIPine 5 mg oral tablet: 1 tab(s) orally once a day  Eliquis Starter Pack for Treatment of DVT and PE 5 mg oral tablet: 1 tab(s) orally 2 times a day Take as directed per box 2 tablets every 12 hours x 7 days then 1 tablet every 12 hours after  losartan 100 mg oral tablet: 1 tab(s) orally once a day

## 2024-03-19 NOTE — DISCHARGE NOTE NURSING/CASE MANAGEMENT/SOCIAL WORK - PATIENT PORTAL LINK FT
You can access the FollowMyHealth Patient Portal offered by [facility] by registering at the following website: [email or web address]. By joining [employer]’s FollowMyHealth portal, you will also be able to view your health information using other applications (apps) compatible with our system.

## 2024-03-19 NOTE — PROGRESS NOTE ADULT - PROBLEM SELECTOR PLAN 1
On Eliqus 10mg bid  Echo with  5. Estimated pulmonary artery systolic pressure is 50 mmHg, consistent with moderate-to-severe pulmonary hypertension.  Needs follow up with Pulm HTN specialist  No further cardio recommendations On Eliqus 10mg bid  out pt heme work up  Echo with  5. Estimated pulmonary artery systolic pressure is 50 mmHg, consistent with moderate-to-severe pulmonary hypertension.  Needs follow up with Pulm HTN specialist  No further cardio recommendations

## 2024-03-19 NOTE — DISCHARGE NOTE PROVIDER - NSDCCPCAREPLAN_GEN_ALL_CORE_FT
PRINCIPAL DISCHARGE DIAGNOSIS  Diagnosis: Acute pulmonary embolism  Assessment and Plan of Treatment: f/u heme, cardio. take eliquis as prescribed

## 2024-03-19 NOTE — DISCHARGE NOTE PROVIDER - ATTENDING DISCHARGE PHYSICAL EXAMINATION:
VITALS:   T(C): 36.6 (03-19-24 @ 07:28), Max: 37.1 (03-19-24 @ 00:02)  HR: 94 (03-19-24 @ 12:00) (65 - 94)  BP: 125/86 (03-19-24 @ 12:00) (125/86 - 149/115)  RR: 18 (03-19-24 @ 12:00) (12 - 19)  SpO2: 95% (03-19-24 @ 12:00) (91% - 98%)    GENERAL: NAD, lying in bed comfortably  HEAD:  Atraumatic, Normocephalic  EYES: EOMI, PERRLA, conjunctiva and sclera clear  ENT: Moist mucous membranes  NECK: Supple, No JVD  CHEST/LUNG: Clear to auscultation bilaterally; No rales, rhonchi, wheezing, or rubs. Unlabored respirations  HEART: Regular rate and rhythm; No murmurs, rubs, or gallops  ABDOMEN: BSx4; Soft, nontender, nondistended  EXTREMITIES:  2+ Peripheral Pulses, brisk capillary refill. No clubbing, cyanosis, or edema  NERVOUS SYSTEM:  A&Ox3, no focal deficits   SKIN: No rashes or lesions  PSYCH: Normal affect, euthymic mood

## 2024-03-19 NOTE — PROGRESS NOTE ADULT - SUBJECTIVE AND OBJECTIVE BOX
Patient is a 47 y/o very pleasant Kenyan speaking  male  used [id number])  with PMHx of hyperlipidemia and unprovoked PE (2021) previously on Eliquis for 1 year which was stopped 4 months ago by PCP who presents with chest/abdominal pain. Patient states he was in his normal state of health yesterday, but felt more short of breath this morning especially upon ambulating. Admits to syncopal episode at home. Upon arrival to the ED  patient was noted to be tachycardic to 105 and hypoxic to upper 80s, now on 2L NC. CT scan 3/16/75704 revealing b/l PE with evidence of right heart strain. Labs with elevated trop/lactate, EPIFANIO, and transaminitis. Patient was started on heparin gtt and admitted to ICU for further management of obstructive shock state secondary to recurrent PE.     3/18/2024  Pt seen at bedside in MICU.  He states she is feeling much better. Breathing comfortably.  He denies SOB while on O2. No CP, dizziness.    MEDICATIONS  (STANDING):  chlorhexidine 2% Cloths 1 Application(s) Topical <User Schedule>  heparin  Infusion. 1500 Unit(s)/Hr (15 mL/Hr) IV Continuous <Continuous>    MEDICATIONS  (PRN):  heparin   Injectable 6000 Unit(s) IV Push every 6 hours PRN For aPTT less than 40  heparin   Injectable 3000 Unit(s) IV Push every 6 hours PRN For aPTT between 40 - 57    Vital Signs Last 24 Hrs  T(C): 36.7 (18 Mar 2024 11:00), Max: 37.2 (17 Mar 2024 19:00)  T(F): 98.1 (18 Mar 2024 11:00), Max: 99 (17 Mar 2024 19:00)  HR: 73 (18 Mar 2024 11:00) (60 - 90)  BP: 124/91 (18 Mar 2024 11:00) (104/70 - 145/110)  BP(mean): 102 (18 Mar 2024 11:00) (77 - 120)  RR: 19 (18 Mar 2024 11:00) (8 - 26)  SpO2: 98% (18 Mar 2024 11:00) (91% - 98%)    Parameters below as of 18 Mar 2024 08:00  Patient On (Oxygen Delivery Method): room air    Physical Exam:   General: Obese male in no acute distress.  Alert, oriented, interactive, nonfocal  HEENT: Pupils equal, reactive to light.  Symmetric.  PULM: Clear to auscultation bilaterally. Able to speak in full sentences   CVS: Sinus trythem 72  ABD: Soft, nondistended, nontender, normoactive bowel sounds, no masses  EXT: No edema, nontender  SKIN: Warm and well perfused, no rashes noted.      Labs                          13.5   11.00 )-----------( 149      ( 18 Mar 2024 03:15 )             40.9       CBC                        12.8   11.75 )-----------( 148      ( 17 Mar 2024 09:30 )             38.0     03-18    146<H>  |  108  |  18.1  ----------------------------<  103<H>  3.6   |  27.0  |  0.97    Ca    8.3<L>      18 Mar 2024 03:15  Phos  3.9     03-18  Mg     2.2     03-18    TPro  5.9<L>  /  Alb  3.5  /  TBili  0.4  /  DBili  x   /  AST  111<H>  /  ALT  572<H>  /  AlkPhos  66  03-18      hem  03-17    143  |  108  |  18.5  ----------------------------<  88  3.5   |  25.0  |  0.89    Ca    8.0<L>      17 Mar 2024 09:30  Phos  5.0     03-16  Mg     2.3     03-16    TPro  5.5<L>  /  Alb  3.3  /  TBili  0.7  /  DBili  x   /  AST  244<H>  /  ALT  756<H>  /  AlkPhos  62  03-17    
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 He also endorses a brief syncopal episode at home (15-20 seconds). Noted to be tachycardic, hypotensive, and hypoxemic in ED. CT chest revealed b/l PE with evidence of right heart strain. Labs with elevated trop/lactate, EPIFANIO, and transaminitis.       Events last 24 hours: Bedside POCUS reveals severe pressure and volume overload of RV with positive Dow's Sign. Decision made to administer TPA (50mg) given massive PE criteria. SpO2 low-90s on 2L conventional nasal O2. Hemodynamically intact.        PAST MEDICAL & SURGICAL HISTORY:  Pulmonary embolism      No significant past surgical history          Review of Systems:  CONSTITUTIONAL: No fever, chills, or fatigue  EYES: No eye pain, visual disturbances, or discharge  ENMT:  No difficulty hearing, tinnitus, vertigo; No sinus or throat pain  NECK: No pain or stiffness  RESPIRATORY: No cough, wheezing, chills or hemoptysis; (+) shortness of breath  CARDIOVASCULAR: (+)chest pain, no palpitations, dizziness, or leg swelling  GASTROINTESTINAL: (+)abdominal pain, No nausea, vomiting, or hematemesis; No diarrhea or constipation. No melena or hematochezia.  GENITOURINARY: No dysuria, frequency, hematuria, or incontinence  NEUROLOGICAL: No headaches, memory loss, loss of strength, numbness, or tremors  SKIN: No itching, burning, rashes, or lesions   MUSCULOSKELETAL: No joint pain or swelling; No muscle, back, or extremity pain  PSYCHIATRIC: No depression, anxiety, mood swings, or difficulty sleeping      Medications:            heparin   Injectable 9000 Unit(s) IV Push every 6 hours PRN  heparin   Injectable 4000 Unit(s) IV Push every 6 hours PRN  heparin  Infusion.  Unit(s)/Hr IV Continuous <Continuous>              chlorhexidine 2% Cloths 1 Application(s) Topical <User Schedule>            ICU Vital Signs Last 24 Hrs  T(C): 37.1 (16 Mar 2024 23:15), Max: 37.1 (16 Mar 2024 23:15)  T(F): 98.7 (16 Mar 2024 23:15), Max: 98.7 (16 Mar 2024 23:15)  HR: 71 (17 Mar 2024 04:00) (67 - 113)  BP: 116/94 (17 Mar 2024 04:00) (92/67 - 130/98)  BP(mean): 101 (17 Mar 2024 04:00) (81 - 107)  ABP: --  ABP(mean): --  RR: 13 (17 Mar 2024 04:00) (10 - 29)  SpO2: 94% (17 Mar 2024 04:00) (88% - 98%)    O2 Parameters below as of 17 Mar 2024 04:00  Patient On (Oxygen Delivery Method): nasal cannula  O2 Flow (L/min): 2              I&O's Detail    16 Mar 2024 07:01  -  17 Mar 2024 04:28  --------------------------------------------------------  IN:    IV PiggyBack: 50 mL  Total IN: 50 mL    OUT:    Indwelling Catheter - Urethral (mL): 1210 mL  Total OUT: 1210 mL    Total NET: -1160 mL            LABS:                        12.8   11.20 )-----------( 155      ( 17 Mar 2024 02:14 )             38.4     03-16    139  |  103  |  23.5<H>  ----------------------------<  127<H>  3.6   |  20.0<L>  |  1.16    Ca    9.1      16 Mar 2024 18:50  Phos  5.0     03-16  Mg     2.3     03-16    TPro  5.9<L>  /  Alb  3.6  /  TBili  0.7  /  DBili  x   /  AST  878<H>  /  ALT  1075<H>  /  AlkPhos  73  03-16          CAPILLARY BLOOD GLUCOSE        PT/INR - ( 16 Mar 2024 18:50 )   PT: 12.8 sec;   INR: 1.16 ratio         PTT - ( 17 Mar 2024 02:14 )  PTT:25.0 sec  Urinalysis Basic - ( 16 Mar 2024 18:50 )    Color: x / Appearance: x / SG: x / pH: x  Gluc: 127 mg/dL / Ketone: x  / Bili: x / Urobili: x   Blood: x / Protein: x / Nitrite: x   Leuk Esterase: x / RBC: x / WBC x   Sq Epi: x / Non Sq Epi: x / Bacteria: x      CULTURES:  Rapid RVP Result: NotDetec (03-16-24 @ 18:50)        Physical Examination:    General: No acute distress.  Alert, oriented, interactive, nonfocal    HEENT: Pupils equal, reactive to light.  Symmetric.    PULM: Clear to auscultation bilaterally, no significant sputum production    CVS: Regular rate and rhythm, no murmurs, rubs, or gallops    ABD: Soft, nondistended, nontender, normoactive bowel sounds, no masses    EXT: No edema, nontender    SKIN: Warm and well perfused, no rashes noted.    NEURO: A&Ox3, strength 5/5 all extremities, cranial nerves grossly intact, no focal deficits        RADIOLOGY:     < from: CT Angio Chest PE Protocol w/ IV Cont (03.16.24 @ 17:20) >    FINDINGS:    HEART/VASCULATURE: Bilateral pulmonary emboli, most proximally in the   distal left pulmonary artery, and increased clot burden since prior.   Dilated right ventricle and right atrium, with RV: LV ratio of greater   than 1.5. No pleural effusion.    LUNGS/AIRWAYS/PLEURA: Patent trachea and bronchi. Clear lungs. No pleural   effusion.    LYMPH NODES/MEDIASTINUM: No lymphadenopathy.    UPPER ABDOMEN: Too small to categorize hypodensity in the left hepatic   lobe. Punctate left renal calcification. Unchanged 3 cm round nodule in   the left upper quadrant which may be a splenule, or arising from the left   adrenal gland.    BONES/SOFT TISSUES: Unremarkable.      IMPRESSION:    Bilateral pulmonary emboli, most proximally in the left pulmonary artery,   and associated right heart strain. This was discussed with Dr. Bonilla,   with read back confirmation, at 5:32 PM on 3/16/2024.              CRITICAL CARE TIME SPENT: 45 mins  Time spent evaluating/treating patient with medical issues that pose a high risk for life threatening deterioration and/or end-organ damage, reviewing data/labs/imaging, discussing case with multidisciplinary team, discussing plan/goals of care with patient/family. Non-inclusive of procedure time. The date of entry of this note reflects the date of services rendered.  
[facility] Division of Hospital Medicine    SUBJECTIVE / OVERNIGHT EVENTS:  No events  On NC  Feeling better    Patient denies chest pain, SOB, abd pain, N/V, fever, chills, dysuria or any other complaints. All remainder ROS negative.     MEDICATIONS  (STANDING):  chlorhexidine 2% Cloths 1 Application(s) Topical <User Schedule>  heparin  Infusion. 1500 Unit(s)/Hr (15 mL/Hr) IV Continuous <Continuous>    MEDICATIONS  (PRN):  heparin   Injectable 6000 Unit(s) IV Push every 6 hours PRN For aPTT less than 40  heparin   Injectable 3000 Unit(s) IV Push every 6 hours PRN For aPTT between 40 - 57        I&O's Summary    17 Mar 2024 07:01  -  18 Mar 2024 07:00  --------------------------------------------------------  IN: 341 mL / OUT: 2625 mL / NET: -2284 mL    18 Mar 2024 07:01  -  18 Mar 2024 12:30  --------------------------------------------------------  IN: 0 mL / OUT: 600 mL / NET: -600 mL        PHYSICAL EXAM:  Vital Signs Last 24 Hrs  T(C): 36.7 (18 Mar 2024 11:00), Max: 37.2 (17 Mar 2024 19:00)  T(F): 98.1 (18 Mar 2024 11:00), Max: 99 (17 Mar 2024 19:00)  HR: 73 (18 Mar 2024 11:00) (60 - 90)  BP: 124/91 (18 Mar 2024 11:00) (111/84 - 145/110)  BP(mean): 102 (18 Mar 2024 11:00) (87 - 120)  RR: 19 (18 Mar 2024 11:00) (8 - 26)  SpO2: 98% (18 Mar 2024 11:00) (91% - 98%)    Parameters below as of 18 Mar 2024 08:00  Patient On (Oxygen Delivery Method): room air            CONSTITUTIONAL: NAD, appears stated age  ENMT: Moist oral mucosa, no pharyngeal injection or exudates; normal dentition  RESPIRATORY: Normal respiratory effort; clear to auscultation bilaterally  CARDIOVASCULAR: Regular rate and rhythm, normal S1 and S2, no murmur/rub/gallop; Peripheral pulses are 2+ bilaterally  ABDOMEN: Nontender to palpation, normoactive bowel sounds, no rebound/guarding;   MUSCLOSKELETAL:  No clubbing or cyanosis of digits; no joint swelling or tenderness to palpation  PSYCH: A+O to person, place, and time; affect appropriate  NEUROLOGY: CN 2-12 are intact and symmetric; no gross sensory deficits;   SKIN: No rashes; no palpable lesions    LABS:                        13.5   11.00 )-----------( 149      ( 18 Mar 2024 03:15 )             40.9     03-18    146<H>  |  108  |  18.1  ----------------------------<  103<H>  3.6   |  27.0  |  0.97    Ca    8.3<L>      18 Mar 2024 03:15  Phos  3.9     03-18  Mg     2.2     03-18    TPro  5.9<L>  /  Alb  3.5  /  TBili  0.4  /  DBili  x   /  AST  111<H>  /  ALT  572<H>  /  AlkPhos  66  03-18    PT/INR - ( 16 Mar 2024 18:50 )   PT: 12.8 sec;   INR: 1.16 ratio         PTT - ( 18 Mar 2024 03:15 )  PTT:71.4 sec  CARDIAC MARKERS ( 18 Mar 2024 03:00 )  x     / x     / 36 U/L / x     / x          Urinalysis Basic - ( 18 Mar 2024 03:15 )    Color: x / Appearance: x / SG: x / pH: x  Gluc: 103 mg/dL / Ketone: x  / Bili: x / Urobili: x   Blood: x / Protein: x / Nitrite: x   Leuk Esterase: x / RBC: x / WBC x   Sq Epi: x / Non Sq Epi: x / Bacteria: x        Culture - Blood (collected 16 Mar 2024 17:31)  Source: .Blood Blood-Peripheral  Preliminary Report (17 Mar 2024 23:02):    No growth at 24 hours    Culture - Blood (collected 16 Mar 2024 17:30)  Source: .Blood Blood-Peripheral  Preliminary Report (17 Mar 2024 23:02):    No growth at 24 hours      CAPILLARY BLOOD GLUCOSE            RADIOLOGY & ADDITIONAL TESTS:  Results Reviewed:   Imaging Personally Reviewed:  Electrocardiogram Personally Reviewed:                                          
                                                         [facility]                                                         CARDIOLOGY AT [place] [address]                                                         Telephone: [phone]. Fax:[phone]                                                                             PROGRESS NOTE    Reason for follow up:  PE  Update: Interviewed with [employer] interpre[employer]  Patient states he is feeling better  On 2 L nc  On Eliquis 10mg bid  One desat at night to 88%  but mostly >95%    Review of symptoms:   Cardiac:  No chest pain. No dyspnea. No palpitations.  Respiratory: no cough. No dyspnea  Gastrointestinal: No diarrhea. No abdominal pain. No bleeding.   Neuro: No focal neuro complaints.      Vitals:  T(C): 36.6 (03-19-24 @ 07:28), Max: 37.1 (03-19-24 @ 00:02)  HR: 77 (03-19-24 @ 06:00) (65 - 97)  BP: 143/95 (03-19-24 @ 06:00) (111/84 - 149/115)  RR: 18 (03-19-24 @ 06:00) (11 - 23)  SpO2: 95% (03-19-24 @ 06:00) (95% - 98%)  Wt(kg): --  I&O's Summary    18 Mar 2024 07:01  -  19 Mar 2024 07:00  --------------------------------------------------------  IN: 240 mL / OUT: 2600 mL / NET: -2360 mL      Weight (kg): 73.5 (03-16 @ 21:00)      PHYSICAL EXAM:  Appearance: Comfortable. No acute distress  HEENT:  Atraumatic. Normocephalic.  Normal oral mucosa  Neurologic: A & O x 3, no gross focal deficits.  Cardiovascular: RRR S1 S2, No murmur, no rubs/gallops. No JVD  Respiratory: Lungs clear to auscultation, unlabored   Gastrointestinal:  Soft, Non-tender, + BS  Lower Extremities: No edema  Psychiatry: Patient is calm. No agitation.   Skin: warm and dry.      CURRENT MEDICATIONS:  MEDICATIONS  (STANDING):  apixaban 10 milliGRAM(s) Oral two times a day  chlorhexidine 2% Cloths 1 Application(s) Topical <User Schedule>  heparin  Infusion. 1500 Unit(s)/Hr (15 mL/Hr) IV Continuous <Continuous>  losartan 25 milliGRAM(s) Oral daily    LABS:	 	  CARDIAC MARKERS ( 18 Mar 2024 03:00 )  x     / x     / 36 U/L / x     / x      p-BNP 18 Mar 2024 03:00: x                              14.0   11.98 )-----------( 163      ( 19 Mar 2024 05:55 )             43.1     03-18    146<H>  |  108  |  18.1  ----------------------------<  103<H>  3.6   |  27.0  |  0.97    Ca    8.3<L>      18 Mar 2024 03:15  Phos  3.9     03-18  Mg     2.2     03-18    TPro  5.9<L>  /  Alb  3.5  /  TBili  0.4  /  DBili  x   /  AST  111<H>  /  ALT  572<H>  /  AlkPhos  66  03-18    proBNP:   Lipid Profile: Date: 03-18 @ 03:15  Total cholesterol 135; Direct LDL: --; HDL: 41; Triglycerides:154    TELEMETRY: NSR    < from: TTE Limited W or WO Ultrasound Enhancing Agent (03.17.24 @ 09:10) >   1. Left ventricular wall thickness is normal. Left ventricular systolic function is normal with an ejection fraction visually estimated at 55 to 60 %. There are no regional wall motion abnormalities seen.   2. Moderately dilated RV with severely reduced RV systolic function.   3. The right atrium is mildly dilated.   4. Mild to moderate tricuspid regurgitation.   5. Estimated pulmonary artery systolic pressure is 50 mmHg, consistent with moderate-to-severe pulmonary hypertension.   6. No pericardial effusion seen.   7. Compared to the transthoracic echocardiogram performed on 3/16/2024, no sig interval change.  	      
                                                         [facility]                                                         CARDIOLOGY AT [facility]                                                                  [address], [place] [address]                                                         Telephone: [phone]. Fax:[phone]                                                                             PROGRESS NOTE    Reason for follow up:  Pulmonary embolism and DVT  Update: States he is feeling fine and wants to go home  Still on o2 4L nc  o2 sat 98% at rest  Telemetry Sinus rhythm no arrhythmias      Review of symptoms:   Cardiac:  No chest pain. No dyspnea. No palpitations.  Respiratory: no cough. No dyspnea  Gastrointestinal: No diarrhea. No abdominal pain. No bleeding.   Neuro: No focal neuro complaints.      Vitals:  T(C): 36.9 (03-18-24 @ 07:00), Max: 37.2 (03-17-24 @ 19:00)  HR: 78 (03-18-24 @ 09:00) (60 - 90)  BP: 137/100 (03-18-24 @ 09:00) (103/85 - 145/110)  RR: 18 (03-18-24 @ 09:00) (8 - 26)  SpO2: 98% (03-18-24 @ 09:00) (91% - 98%)  Wt(kg): --  I&O's Summary    17 Mar 2024 07:01  -  18 Mar 2024 07:00  --------------------------------------------------------  IN: 341 mL / OUT: 2625 mL / NET: -2284 mL    Weight (kg): 73.5 (03-16 @ 21:00)    PHYSICAL EXAM:  Appearance: Comfortable. No acute distress  HEENT:  Atraumatic. Normocephalic.  Normal oral mucosa  Neurologic: A & O x 3, no gross focal deficits.  Cardiovascular: RRR S1 S2, No murmur, no rubs/gallops. No JVD  Respiratory: Lungs clear to auscultation, unlabored   Gastrointestinal:  Soft, Non-tender, + BS  Lower Extremities: No edema  tatoo on right lower ext  Psychiatry: Patient is calm. No agitation.   Skin: warm and dry.    CURRENT MEDICATIONS:  MEDICATIONS  (STANDING):  chlorhexidine 2% Cloths 1 Application(s) Topical <User Schedule>  heparin  Infusion. 1500 Unit(s)/Hr (15 mL/Hr) IV Continuous <Continuous>    LABS:	 	  CARDIAC MARKERS ( 18 Mar 2024 03:00 )  x     / x     / 36 U/L / x     / x      p-BNP 18 Mar 2024 03:00: x                              13.5   11.00 )-----------( 149      ( 18 Mar 2024 03:15 )             40.9     03-18    146<H>  |  108  |  18.1  ----------------------------<  103<H>  3.6   |  27.0  |  0.97    Ca    8.3<L>      18 Mar 2024 03:15  Phos  3.9     03-18  Mg     2.2     03-18    TPro  5.9<L>  /  Alb  3.5  /  TBili  0.4  /  DBili  x   /  AST  111<H>  /  ALT  572<H>  /  AlkPhos  66  03-18    proBNP:   Lipid Profile: Date: 03-18 @ 03:15  Total cholesterol 135; Direct LDL: --; HDL: 41; Triglycerides:154    DIAGNOSTIC TESTING:  [ ] Echocardiogram: < from: TTE Limited W or WO Ultrasound Enhancing Agent (03.17.24 @ 09:10) >   1. Left ventricular wall thickness is normal. Left ventricular systolic function is normal with an ejection fraction visually estimated at 55 to 60 %. There are no regional wall motion abnormalities seen.   2. Moderately dilated RV with severely reduced RV systolic function.   3. The right atrium is mildly dilated.   4. Mild to moderate tricuspid regurgitation.   5. Estimated pulmonary artery systolic pressure is 50 mmHg, consistent with moderate-to-severe pulmonary hypertension.   6. No pericardial effusion seen.   7. Compared to the transthoracic echocardiogram performed on 3/16/2024, no sig interval change.    < from: CT Angio Chest PE Protocol w/ IV Cont (03.16.24 @ 17:20) >    Bilateral pulmonary emboli, most proximally in the left pulmonary artery,   and associated right heart strain. This was discussed with Dr. Bonilla,   with read back confirmation, at 5:32 PM on 3/16/2024.      	      
47 y/o very pleasant Azeri speaking  male  used [id number])  with PMHx of hyperlipidemia and unprovoked PE (2021) previously on Eliquis for 1 year which was stopped 4 months ago by PCP who presents with chest/abdominal pain. Patient states he was in his normal state of health yesterday, but felt more short of breath this morning especially upon ambulating. Admits to syncopal episode at home. Upon arrival to the ED  patient was noted to be tachycardic to 105 and hypoxic to upper 80s, now on 2L NC. CT scan 3/16/68841 revealing b/l PE with evidence of right heart strain. Labs with elevated trop/lactate, EPIFANIO, and transaminitis. Patient was started on heparin gtt and admitted to ICU for further management of obstructive shock state secondary to recurrent PE.     3/19: No acute overnight events noted.  On heparin gtt.    Hemodynamically stable.  On oxygen via nasal cannula at 2 liters/minute.    Vital Signs Last 24 Hrs  T(C): 36.6 (19 Mar 2024 07:28), Max: 37.1 (19 Mar 2024 00:02)  T(F): 97.8 (19 Mar 2024 07:28), Max: 98.7 (19 Mar 2024 00:02)  HR: 74 (19 Mar 2024 08:00) (65 - 97)  BP: 125/96 (19 Mar 2024 08:00) (124/91 - 149/115)  BP(mean): 103 (19 Mar 2024 08:00) (97 - 127)  RR: 18 (19 Mar 2024 08:00) (11 - 20)  SpO2: 96% (19 Mar 2024 08:00) (95% - 98%)  Parameters below as of 19 Mar 2024 08:00  Patient On (Oxygen Delivery Method): nasal cannula  O2 Flow (L/min): 2      MEDICATIONS  (STANDING):  apixaban 10 milliGRAM(s) Oral two times a day  chlorhexidine 2% Cloths 1 Application(s) Topical <User Schedule>  heparin  Infusion. 1500 Unit(s)/Hr (15 mL/Hr) IV Continuous <Continuous>  losartan 25 milliGRAM(s) Oral daily  MEDICATIONS  (PRN):  heparin   Injectable 6000 Unit(s) IV Push every 6 hours PRN For aPTT less than 40  heparin   Injectable 3000 Unit(s) IV Push every 6 hours PRN For aPTT between 40 - 57    CBC:            14.0   11.98 )-----------( 163      ( 03-19-24 @ 05:55 )             43.1     Chem:         ( 03-18-24 @ 03:15 )    146<H>  |  108  |  18.1  ----------------------------<  103<H>  3.6   |  27.0  |  0.97    Liver Functions: ( 03-18-24 @ 03:15 )  Alb: 3.5 g/dL / Pro: 5.9 g/dL / ALK PHOS: 66 U/L / ALT: 572 U/L / AST: 111 U/L / GGT: x          Physical Exam:   General: Obese male in no acute distress.  Alert, oriented, interactive, nonfocal  HEENT: Pupils equal, reactive to light.  Symmetric.  PULM: Clear to auscultation bilaterally. Able to speak in full sentences   CVS: Sinus trythem 72  ABD: Soft, nondistended, nontender, normoactive bowel sounds, no masses  EXT: No edema, nontender  SKIN: Warm and well perfused, no rashes noted.    CT Angio Chest PE Protocol w/ IV Cont 3/16/2024  IMPRESSION:  Bilateral pulmonary emboli, most proximally in the left pulmonary artery, and associated right heart strain.    Abdomen Upper Quadrant Right 3/16/2024  IMPRESSION:  Hepatomegaly. Hepatic steatosis. Slightly micronodular contour to the anterior margin of the liver, correlate clinically for early cirrhosis. 2 liver cysts. Gallbladder polyps up to 0.5 cm, follow-up. No cholelithiasis or sonographic evidence of cholecystitis.

## 2024-03-19 NOTE — PROGRESS NOTE ADULT - NS ATTEND AMEND GEN_ALL_CORE FT
As above, pt with recurrent PE (massive) with (+) anticardiolipin Ab.  Doing much better clinically, labs and by bedside POCUS.  Cont AC, to be transitioned to OAC after Hem consult recs.  Likely no further invasive measures needed at moment.   D/w ICU MD, ok to transition to stepdown unit.
seen with above,      48M Syrian-speaking working in the [employer], history significant for obesity, submassive PE s/p EKOS in 2021 with cardiolipin antibody positive did not follow up with outpatient cardiology or hematology seen by PCP recently and discontinued Eliquis after use >1 year, admitted with recurrent submassive PE with R-heart strain, liver congestion s/p half-dose TPA on 3/16/24, +DVT   -hemodynamically stable except remains on 4L NC saturating 98%, currently no indication for mechanical thrombectomy as continues to improve  -transition heparin gtt to oral anticoagulant, given h/o cardiolipin antibody if confirms other Lupus anticoagulant antibodies would need warfarin use rather than Eliquis, hematology following, needs indefinite lifelong AC  -recurrent PE at risk for CTEPH, needs to establish care with pulmonary HTN specialist upon discharge  -needs to follow up in the office within 2 weeks  -stable for discharge home today if no desaturation on RA off supplement oxygen        Delonte Urbina DO, [facility]  Faculty Non-Invasive Cardiologist  [phone]
seen with above,    48M [place]ian-speaking ([place]  [id number]) working in the [employer], history significant for obesity, submassive PE s/p EKOS in 2021 with cardiolipin antibody positive did not follow up with outpatient cardiology or hematology seen by PCP recently and discontinued Eliquis after use >1 year, admitted with recurrent submassive PE with R-heart strain, liver congestion s/p half-dose TPA on 3/16/24  -hemodynamically stable except remains on 4L NC saturating 98%, currently no indication for mechanical thrombectomy if continues to improve, wean off supplemental oxygen as tolerated   -transition heparin gtt to oral anticoagulant, given h/o cardiolipin antibody if confirms other Lupus anticoagulant antibodies would need warfarin use rather than Eliquis, hematology following, needs indefinite lifelong AC  -recurrent PE at risk for CTEPH, needs to establish care with pulmonary HTN specialist upon discharge  -needs to follow up in the office within 2 weeks      Delonte Urbina DO, [facility]  Faculty Non-Invasive Cardiologist  [phone]

## 2024-03-19 NOTE — PROGRESS NOTE ADULT - ASSESSMENT
Pleasant 49 y/o Kiswahili speaking male  with PMH of HLD and unprovoked PE (2021, was on Eliquis for 1 year then taken off approx 4 mo ago by his PCP  and anticardiolipin AB positive (2021.  Came to the ED with complaints of chest/abdominal pain and dyspnea with minimal exertion x 2 days. He also reported a brief syncopal episode at home.  He was noted to be tachycardic, hypotensive, and hypoxemic in ER. CT chest revealed b/l PE with evidence of right heart strain. Labs with elevated trop/lactate, EPIFANIO, and transaminitis. Pt was administered 50mg of tpa.  He is currently downgraded to SD waiting for a bed assignment.    1) B/L PE with RV strain   history of anticardiolipin AB positive in 2021  PE in 2021 was on Eliquis for 1 yr then taken off 4 mo ago by PCP  s/p TPA overnight in MICU  On heparin gtt  Transition to DOAC when stable for DC  ordered cardiolipin antibody total, Beta 2 glycoprotein antibody, and Lupus anticoagulant  Will need indefinite A/C due to reoccurrence of PE  Will need a hypercoagulability ,autoimmune workup as an outpt  Follow up with Dr Adrian SHIPMAN upon DC    Anemia multifactorial  Hgb on admission 13.3  Hgb today wnl at 13.5  Plt 149k.      Leukocytosis likely reactive  WBC  on adm 13.52  WBC today 11.0.  CT chest neg for PN  Will monitor    Acute Hypoxic Respiratory Failure due to Bilateral PE with RV strain  s/p TPA overnight in MICU  TTE - preserved EF; severely enlarged RV, severely reduced systolic function, with evidence of McConells sign  repeat TTE with moderately dilated RV, no wall motion abnormalities  bedside POCUS with significant improvement per ICU team  duplex with RLE DVT  elevated trops   continue heparin gtt  Cardio following    shock liver  trend LFT, starting to decrease.    CT Angio Chest PE Protocol w/ IV Cont 3/16/2024    IMPRESSION:  Bilateral pulmonary emboli, most proximally in the left pulmonary artery, and associated right heart strain.    Abdomen Upper Quadrant Right 3/16/2024  IMPRESSION:  Hepatomegaly. Hepatic steatosis. Slightly micronodular contour to the anterior margin of the liver, correlate clinically for early cirrhosis. 2 liver cysts. Gallbladder polyps up to 0.5 cm, follow-up. No cholelithiasis or sonographic evidence of cholecystitis.        Thank you   [phone]  
49 y/o M with a h/o unprovoked PE (2021, apixaban stopped 4 months ago by PCP), anticardiolipin AB positive (2021, unknown to patient), with:    # Massive pulmonary embolism  # Obstructive shock  # Cor pulmonale  # Transaminitis  # EPIFANIO  # Acute hypoxemic respiratory failure    - bedside POCUS reveals severe pressure and volume overload of RV with positive Dow's Sign, f/u formal TTE  - after discussion with PERT team the decision was made to administer TPA (50mg) given evidence of obstructive shock and severe RV failure/cor pulmonale, can consider additional 50mg depending on clinical course  - restart heparin infusion when aPTT < 80  - monitor hemodynamics closely, maintain a MAP > 65, he is at increased risk for further life threatening hemodynamic decompensation  - bilateral lower extremity venous dopplers ordered to assess for VTE  - will need hematology consult, anticardiolipin AB positive noted from hypercoagulable workup from 2021- this is unknown to the patient whose anticoagulation was recently stopped by his PCP  - transaminitis likely primarily related to passive hepatic congestion from severe RV failure, trend LFTs  - EPIFANIO is pre-renal, trend BUN/Cr, electrolytes, acid-base balance, and monitor UOP  - supplemental O2 as necessary to maintain SpO2 > 92%  - avoid positive pressure ventilation as best as possible in order to avoid preload reduction and further hemodynamic compromise      Case discussed extensively with the patient and his wife at the bedside with the assistance of in-house , Anatoly. Diagnosis, prognosis, and management plan outlined. He fully understands the risks/benefits of systemic thrombolysis (most serious being spontaneous ICH/GIB/death) and consents to the treatment. All questions answered and concerns addressed.    Case discussed with MICU physician, Dr. Valiente.           
49 y/o Urdu speaking male  with PMH of HLD and unprovoked PE (2021, was on Eliquis for 1 year then taken off approx 4 mo ago by his PCP  and anticardiolipin AB positive (2021.  Came to the ED with complaints of chest/abdominal pain and dyspnea with minimal exertion x 2 days. He also reported a brief syncopal episode at home.  He was noted to be tachycardic, hypotensive, and hypoxemic in ER. CT chest revealed b/l PE with evidence of right heart strain. Labs with elevated trop/lactate, EPIFANIO, and transaminitis. Pt was administered 50mg of tpa.  He is currently downgraded to SD waiting for a bed assignment.    1) B/L PE with RV strain   history of anticardiolipin AB positive in 2021  PE in 2021 was on Eliquis for 1 yr then taken off 4 mo ago by PCP  s/p TPA  On heparin gtt  Transition to DOAC when stable for DC  ordered cardiolipin antibody total, Beta 2 glycoprotein antibody, and Lupus anticoagulant  Will need indefinite A/C due to reoccurrence of PE  Will need a hypercoagulability ,autoimmune workup as an outpt  Follow up with Dr Adrian SHIPMAN upon DC    Anemia multifactorial  Hgb on admission 13.3  Hgb today wnl at 14.0  Plt 163k.      Leukocytosis likely reactive  WBC  on adm 13.52  WBC today 11.9.  CT chest neg for PN  Will monitor    Acute Hypoxic Respiratory Failure due to Bilateral PE with RV strain  s/p TPA overnight in MICU  TTE - preserved EF; severely enlarged RV, severely reduced systolic function, with evidence of McConells sign  repeat TTE with moderately dilated RV, no wall motion abnormalities  bedside POCUS with significant improvement per ICU team  duplex with RLE DVT  continue heparin gtt  Cardio following    shock liver  trend LFT, starting to decrease.  Abdominal sonogram noted above.  
48M with PMH of HLD, unprovoked PE (2021, apixaban stopped 4 months ago by PCP) and anticardiolipin AB positive (2021, unknown to patient) who was admitted on 3/16 with complaints of chest/abdominal pain and dyspnea with minimal exertion x 2, syncopal episode at home (15-20 seconds). Tachycardic, hypotensive, and hypoxemic in ER. CT chest revealed b/l PE with evidence of right heart strain. Labs with elevated trop/lactate, EPIFANIO, and transaminitis. Pt was administered 50mg of tpa and is now stable for transfer to step down unit.    Acute Hypoxic Respiratory Failure due to Bilateral PE with RV strain  RLE DVT  -TTE - preserved EF; severely enlarged RV, severely reduced systolic function, with evidence of McConells sign  -s/p TPA overnight in MICU  -repeat TTE with moderately dilated RV, no wall motion abnormalities  -continue heparin gtt  -hematology consult given history of anticardiolipin AB positive in 2021  -eventual transition to NOAC. (Per Heme patient only with 1 of 3 ab. May Cont plan for DOAC as opposed to coumadin outpt f/u with heme)  -wean O2 as tolerated; home O2 eval prior to discharge  -Cardio recs appreciated (cosigned the bottom of the ICU note from today)    History of HLD  -not on statin due to transaminitis    Leukocytosis likely reactive  -WBC 16 --> 11  -RVP negative  -CT without evidence of PNA  -no urinary complaints  -observe off abx    Abnormal CT  -Unchanged 3 cm round nodule in the left upper quadrant which may be a splenule, or arising from the left adrenal gland  -outpatient surveillance     Transaminitis due to shock state  -LFTS improving daily  -hep panel negative  -RUQ sono Hepatomegaly. Hepatic steatosis. Slightly micronodular contour to the anterior margin of the liver and 2 liver cysts  -outpatient hepatology referral    DVT ppx - heparin gtt    Dispo-   Monitor Today. Wean O2 as tolerate. Possible DC tomorrow  
48 year old male with PMH of PE taken off NOAC several months ago and represents with sob and found to have bilateral PE most proximally in the left pulmonary artery with associated right heart starain  Echo -  EF 55% Moderately dilated rv with severely reduced RV systolic function  RSvp 50MMhg  Seen by Heme  Cardio lipin antibody positive  2021  + Right DVT  s/p 1/2 dose TPA            
48 year old male with PMH of PE taken off NOAC several months ago and represents with sob and found to have bilateral PE most proximally in the left pulmonary artery with associated right heart starain  Echo -  EF 55% Moderately dilated rv with severely reduced RV systolic function  RSvp 50MMhg  Seen by Heme  Cardio lipin antibody positive  2021  + Right DVT  s/p TPA

## 2024-03-19 NOTE — DISCHARGE NOTE NURSING/CASE MANAGEMENT/SOCIAL WORK - NSDCPEFALRISK_GEN_ALL_CORE
For information on Fall & Injury Prevention, visit: [email or web address] OR  [email or web address] OR  https://www.cdc.gov/steadi/patient.html

## 2024-03-20 LAB
B2 GLYCOPROT1 AB SER QL: NEGATIVE — SIGNIFICANT CHANGE UP
CARDIOLIPIN AB SER-ACNC: NEGATIVE — SIGNIFICANT CHANGE UP
CARDIOLIPIN AB SER-ACNC: NEGATIVE — SIGNIFICANT CHANGE UP

## 2024-03-21 LAB
CULTURE RESULTS: SIGNIFICANT CHANGE UP
CULTURE RESULTS: SIGNIFICANT CHANGE UP
SPECIMEN SOURCE: SIGNIFICANT CHANGE UP
SPECIMEN SOURCE: SIGNIFICANT CHANGE UP

## 2024-05-15 NOTE — CONSULT NOTE ADULT - ASSESSMENT
Patient is a 47 y/o M with PMH of HLD and unprovoked PE (2021, apixaban stopped 4 months ago by PCP) and anticardiolipin AB positive (2021, unknown to patient) who was admitted on 3/16 with complaints of chest/abdominal pain and dyspnea with minimal exertion x 2 days. He also endorsed a brief syncopal episode at home (15-20 seconds). Noted to be tachycardic, hypotensive, and hypoxemic in ER. CT chest revealed b/l PE with evidence of right heart strain. Labs with elevated trop/lactate, EPIFANIO, and transaminitis. Pt was administered 50mg of tpa and is now stable for transfer to step down unit.    1. Acute Hypoxic Respiratory Failure due to Bilateral PE with RV strain  -s/p TPA overnight in MICU  -vitals stable today  --->105  -BNP elevated  -TTE - preserved EF; severely enlarged RV, severely reduced systolic function, with evidence of McConells sign  -repeat TTE with moderately dilated RV, no wall motion abnormalities  -bedside POCUS with significant improvement per ICU team  -duplex with RLE DVT  -continue heparin gtt  -hematology consult given history of anticardiolipin AB positive in 2021  -eventual transition to NOAC   -wean O2 as tolerated; home O2 eval prior to discharge  -Cardio recs appreciated (cosigned the bottom of the ICU note from today)    2. History of HLD  -check lipid panel  -not on statin due to transaminitis    3. Leukocytosis likely reactive  -WBC 16 --> 11  -RVP negative  -CT without evidence of PNA  -no urinary complaints  -observe off abx    4. Abnormal CT  -incidental hypodensity in the left hepatic lobe. Punctate left renal calcification. Unchanged 3 cm round nodule in   the left upper quadrant which may be a splenule, or arising from the left adrenal gland.  -outpatient surveillance     5. Transaminitis due to shock state  -LFTS improving daily  -hep panel negative  -CT with normal liver morphology  -repeat LFTS in AM    DVT ppx - heparin gtt    Dispo- Downgrade to step down unit. Continue heparin gtt. Hematology evaluation. Home O2 eval.     Patient is a 49 y/o M with PMH of HLD and unprovoked PE (2021, apixaban stopped 4 months ago by PCP) and anticardiolipin AB positive (2021, unknown to patient) who was admitted on 3/16 with complaints of chest/abdominal pain and dyspnea with minimal exertion x 2 days. He also endorsed a brief syncopal episode at home (15-20 seconds). Noted to be tachycardic, hypotensive, and hypoxemic in ER. CT chest revealed b/l PE with evidence of right heart strain. Labs with elevated trop/lactate, EPIFANIO, and transaminitis. Pt was administered 50mg of tpa and is now stable for transfer to step down unit.    1. Acute Hypoxic Respiratory Failure due to Bilateral PE with RV strain  -s/p TPA overnight in MICU  -vitals stable today  --->105  -BNP elevated  -TTE - preserved EF; severely enlarged RV, severely reduced systolic function, with evidence of McConells sign  -repeat TTE with moderately dilated RV, no wall motion abnormalities  -bedside POCUS with significant improvement per ICU team  -duplex with RLE DVT  -continue heparin gtt  -hematology consult given history of anticardiolipin AB positive in 2021  -eventual transition to NOAC   -wean O2 as tolerated; home O2 eval prior to discharge  -Cardio recs appreciated (cosigned the bottom of the ICU note from today)    2. History of HLD  -check lipid panel  -not on statin due to transaminitis    3. Leukocytosis likely reactive  -WBC 16 --> 11  -RVP negative  -CT without evidence of PNA  -no urinary complaints  -observe off abx    4. Abnormal CT  -Unchanged 3 cm round nodule in the left upper quadrant which may be a splenule, or arising from the left adrenal gland  -outpatient surveillance     5. Transaminitis due to shock state  -LFTS improving daily  -hep panel negative  -RUQ sono Hepatomegaly. Hepatic steatosis. Slightly micronodular contour to the anterior margin of the liver and 2 liver cysts  -repeat LFTS in AM  -outpatient hepatology referral    DVT ppx - heparin gtt    Dispo- Downgrade to step down unit. Continue heparin gtt. Hematology evaluation. Home O2 eval.     DISCHARGE
